# Patient Record
Sex: MALE | Race: WHITE | NOT HISPANIC OR LATINO | Employment: FULL TIME | URBAN - METROPOLITAN AREA
[De-identification: names, ages, dates, MRNs, and addresses within clinical notes are randomized per-mention and may not be internally consistent; named-entity substitution may affect disease eponyms.]

---

## 2018-03-20 ENCOUNTER — HISTORICAL (OUTPATIENT)
Dept: RADIATION THERAPY | Facility: HOSPITAL | Age: 38
End: 2018-03-20

## 2018-04-02 ENCOUNTER — HISTORICAL (OUTPATIENT)
Dept: RADIATION THERAPY | Facility: HOSPITAL | Age: 38
End: 2018-04-02

## 2018-04-09 ENCOUNTER — OFFICE VISIT (OUTPATIENT)
Dept: OTOLARYNGOLOGY | Facility: CLINIC | Age: 38
End: 2018-04-09
Payer: COMMERCIAL

## 2018-04-09 ENCOUNTER — CLINICAL SUPPORT (OUTPATIENT)
Dept: AUDIOLOGY | Facility: CLINIC | Age: 38
End: 2018-04-09
Payer: COMMERCIAL

## 2018-04-09 VITALS — BODY MASS INDEX: 31.7 KG/M2 | WEIGHT: 201.94 LBS | HEIGHT: 67 IN

## 2018-04-09 DIAGNOSIS — D32.9 MENINGIOMA: Primary | ICD-10-CM

## 2018-04-09 DIAGNOSIS — Z01.10 EXAMINATION OF EARS AND HEARING: Primary | ICD-10-CM

## 2018-04-09 DIAGNOSIS — R29.810 FACIAL WEAKNESS: ICD-10-CM

## 2018-04-09 PROCEDURE — 92552 PURE TONE AUDIOMETRY AIR: CPT | Mod: S$GLB,,, | Performed by: PHYSICIAN ASSISTANT

## 2018-04-09 PROCEDURE — 92556 SPEECH AUDIOMETRY COMPLETE: CPT | Mod: S$GLB,,, | Performed by: PHYSICIAN ASSISTANT

## 2018-04-09 PROCEDURE — 99999 PR PBB SHADOW E&M-EST. PATIENT-LVL I: CPT | Mod: PBBFAC,,,

## 2018-04-09 PROCEDURE — 99999 PR PBB SHADOW E&M-NEW PATIENT-LVL III: CPT | Mod: PBBFAC,,, | Performed by: OTOLARYNGOLOGY

## 2018-04-09 PROCEDURE — 99205 OFFICE O/P NEW HI 60 MIN: CPT | Mod: S$GLB,,, | Performed by: OTOLARYNGOLOGY

## 2018-04-09 RX ORDER — SERTRALINE HYDROCHLORIDE 50 MG/1
TABLET, FILM COATED ORAL
COMMUNITY
Start: 2018-02-03

## 2018-04-09 RX ORDER — PANTOPRAZOLE SODIUM 40 MG/1
TABLET, DELAYED RELEASE ORAL
COMMUNITY
Start: 2018-04-03

## 2018-04-09 RX ORDER — FENOFIBRATE 145 MG/1
TABLET, FILM COATED ORAL
COMMUNITY
Start: 2018-04-08

## 2018-04-09 RX ORDER — MECLIZINE HYDROCHLORIDE 25 MG/1
25 TABLET ORAL EVERY 12 HOURS
Refills: 0 | COMMUNITY
Start: 2018-03-28

## 2018-04-09 NOTE — PROGRESS NOTES
Audiological Evaluation:    Test results indicated normal hearing AS and a mild loss AD at 8k Hz. with excellent speech discrimination scores bilaterally.  Recommend:  1.  Otologic evaluation  2.  Annual hearing evaluations  3.  Noise protection when necessary

## 2018-04-09 NOTE — LETTER
April 9, 2018      Mitchel Farias MD  [address]  [facility] [address]           [facility] - [facility]  [address]  [facility] [address]  Phone: [phone]  Fax: [phone]          Patient: Marcelo Farias   MR Number: [id number]   YOB: 1980   Date of Visit: 4/9/2018       Dear Dr. Mitchel Farias:    Thank you for referring Marcelo Farias to me for evaluation. Attached you will find relevant portions of my assessment and plan of care.    If you have questions, please do not hesitate to call me. I look forward to following Marcelo Farias along with you.    Sincerely,    Juan English MD    Enclosure  CC:  No Recipients    If you would like to receive this communication electronically, please contact [email or web address] or [phone] to request more information on [employer] Link access.    For providers and/or their staff who would like to refer a patient to [facility], please contact us through our one-stop-shop provider referral line, [facility], at [phone].    If you feel you have received this communication in error or would no longer like to receive these types of communications, please e-mail [email or web address]

## 2018-04-09 NOTE — PROGRESS NOTES
"Otology/Neurotology Consultation Report       Chief Complaint: Right sided facial weakness     History of Present Illness: 37 y.o. year old male presents a referral from Dr. Wade Turner ([place], [place]) for evaluation of recurrent episodes (3-4 episodes) of acute/abrupt onset facial paralysis with associated facial numbness/paresthesias. Patient relates first episode occurred in 2012, which he was treated with antibiotics and steroids resolution of symptoms over several days. Patient relates intermittent facial spasms in between episodes and increased spasms preceding episodes. Last episode occurred in Feb 2018, while on vacation in the [facility]. Patients that his facial function has not returned to baseline after this most recent episode. Also relates associated AD fullness, vertigo (lasted several days, now resolved) and dizziness/imblance. No previous head or neck surgery, no other growths or tumors.  Patient relates paternal grandmother with multiple "throat tumors". 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    HENT: Positive for congestion, hearing loss and tinnitus. Negative for ear discharge, ear pain, nosebleeds, sinus pain and sore throat.    Eyes: Negative for blurred vision, double vision, photophobia, pain, discharge and redness.   Respiratory: Negative.  Negative for stridor.    Cardiovascular: Negative.    Gastrointestinal: Negative.    Genitourinary: Negative.    Musculoskeletal: Negative.    Skin: Negative.    Neurological: Positive for dizziness, focal weakness and headaches. Negative for tingling, tremors, speech change, seizures and loss of consciousness.   Endo/Heme/Allergies: Negative.    Psychiatric/Behavioral: Negative.           Past Medical History: Patient has a past medical history of Hearing loss; Hyperlipidemia; and Vertigo.    Past Surgical History: " Patient has a past surgical history that includes Finger fracture surgery (Right, 2005).    Social History: Patient reports that he has never smoked. He has never used smokeless tobacco.    Family History: family history is not on file.    Medications:   No current outpatient prescriptions on file prior to visit.     No current facility-administered medications on file prior to visit.          Allergies: Patient is allergic to penicillins; shellfish containing products; and sulfa (sulfonamide antibiotics).    Physical Exam    Constitutional: Well appearing / communicating.  NAD.  Eyes: EOM I Bilaterally, PERRLA. CN VI intact bilaterally   Head/Face: Normocephalic.  Negative paranasal sinus pressure/tenderness.  Salivary glands WNL.  House Brackmann II/VI on the right. I/VI on the left.     Right Ear: External Auditory Canal WNL,TM w/o masses/lesions/perforations.  Auricle WNL.  Left Ear: External Auditory Canal WNL,TM w/o masses/lesions/perforations. Auricle WNL.  Nose:leftward nasal septal deviation. Inferior Turbinates 2+ bilaterally. No septal perforation. No masses/lesions. External nasal skin without masses/lesions.  Oral Cavity: Gingiva/lips WNL.  FOM Soft, no masses palpated. Oral Tongue mobile. Hard Palate WNL.   Oropharynx: BOT WNL. No masses/lesions noted. Tonsillar fossa/pharyngeal wall without lesions. Posterior oropharynx WNL.  Soft palate without masses. Midline uvula.   Neck/Lymphatic: No LAD I-VI bilaterally.  No thyromegaly.  No masses noted on exam. CN II-XII intact with the exception of VII as noted above   Neuro/Psychiatric: AOx3.  Normal mood and affect.   Cardiovascular: Normal carotid pulses bilaterally, no increasing jugular venous distention noted at cervical region bilaterally.    Respiratory: Normal respiratory effort, no stridor, no retractions noted.                                  Marcelo was seen today for consult.    Diagnoses and all orders for this visit:    Meningioma  -      Ambulatory Referral to Neurosurgery    Facial weakness     vs other lesion, ie neuroma of FN, etc  Long discussion with patient regarding clinical and imaging findings (concerning for meningioma of the petrous apex). Discussed possible treatment options with patient including surgery, radiation, and observation. Will review imaging studies with neuroradiology. Will also refer to neurosurgery.     Need to review imaging with neuroradilogy but pt was unable to leave discs today.

## 2018-04-16 ENCOUNTER — OFFICE VISIT (OUTPATIENT)
Dept: NEUROSURGERY | Facility: CLINIC | Age: 38
End: 2018-04-16
Payer: COMMERCIAL

## 2018-04-16 VITALS
HEIGHT: 67 IN | DIASTOLIC BLOOD PRESSURE: 95 MMHG | HEART RATE: 92 BPM | SYSTOLIC BLOOD PRESSURE: 151 MMHG | WEIGHT: 205.5 LBS | TEMPERATURE: 97 F | BODY MASS INDEX: 32.25 KG/M2

## 2018-04-16 DIAGNOSIS — D32.9 MENINGIOMA: Primary | ICD-10-CM

## 2018-04-16 PROCEDURE — 99204 OFFICE O/P NEW MOD 45 MIN: CPT | Mod: S$GLB,,, | Performed by: NEUROLOGICAL SURGERY

## 2018-04-16 PROCEDURE — 99999 PR PBB SHADOW E&M-EST. PATIENT-LVL III: CPT | Mod: PBBFAC,,, | Performed by: NEUROLOGICAL SURGERY

## 2018-04-16 NOTE — PROGRESS NOTES
This office note has been dictated.  April 16, 2018    Juan English M.D.  Department of Otolaryngology  [facility] [address] [place]  [address]    RE:  MARCELO CUELLAR  [facility] No.:  [id number]      Dear Reji:    Marcelo Cuellar was seen in neurosurgical consultation at the office this morning.    As you know, he is a 37-year-old man who suffered sudden onset of right facial   paralysis in 2012, felt to be Bell's palsy.  He was treated with medication and   the facial paralysis essentially resolved.  No imaging studies were done at that   time.  More recently, he has noted some intermittent twitching in the right   side of his face and feels that the face may be a little more asymmetric.  He   notes that the lower lip tends to pull up.  This February, he was vacationing   and had an attack of vertigo, which lasted for a few days.  This was accompanied   by a sense of fullness in his right ear.  He has noted this blocking sensation   in his ear intermittently.  A CT and MRI were done by his physician in [place]   showing a right petrous lesion and he was referred for ENT and neurosurgical   evaluation.  He does not suffer from headaches.  His vision is good.  He has had   no double vision.  He has had no difficulty speaking or swallowing, no weakness   or numbness in extremities, difficulty with gait or balance. Past medical   history is otherwise unremarkable.  He is an .  He is .    Review of systems is otherwise negative.    On physical examination, he is a well-developed, muscular white male who is   alert and cooperative.  Examination of the head shows no tenderness over the   scalp or over the mastoid area.  Eyes show full extraocular movements.  There is   no nystagmus.  Pupils are equal and reactive to light.  Fundi show clear disk   margins.  Hearing seems fairly well preserved to finger rubbing and tuning fork.    Midline tuning fork was actually  referred to the right ear, suggesting   conductive rather than sensorineural hearing loss.  The neck is supple.  On   neurological examination, he is speaking clearly, provides a good history.    Affect is unremarkable.  Finger-to-nose was done well.  Gait is normal.    Sensation on the face is preserved.  There is very slight paresis of right   facial muscles at rest.  Function is normal on voluntary movement.  His voice is   normal.  The tongue protrudes in midline.  He shows good strength in   extremities, normal sensation and symmetrical deep tendon reflexes.    MRI of the brain was done at [facility] in [place] on 03/05/18.    Ventricular size is normal.  There is a small area of enhancement along the   petrous ridge that slightly indents the temporal lobe.  There is no swelling in   the brain.  A CT scan of the temporal bones was then done at [facility] on 03/12/18, and shows that there is some lytic change in the petrous   bone associated with this.    IMPRESSION:  1.  Right petrous lesion, possible meningioma, cholesterol granuloma or other.  2.  History of Bell palsy, right side.    I will ask our neuroradiologist to review these studies.  This lesion appears   benign.  We may plan to follow it up in six months.  It is unclear whether it   relates to his symptoms.    Thank you for the opportunity to see him in neurosurgical consultation.    Sincerely yours,            FELICIA/MIMA  dd: 04/16/2018 11:40:54 (CDT)  td: 04/17/2018 09:07:37 (CDT)  Doc ID   #[id number]  Job ID #[id number]    CC: Marcelo English M.D.

## 2018-04-16 NOTE — LETTER
April 16, 2018      Juan English MD  [address]  [place] [address]           [facility] - Neurosurgery [facility]  [address]  [facility] [address]  Phone: [phone]          Patient: Marcelo Farias   MR Number: [id number]   YOB: 1980   Date of Visit: 4/16/2018       Dear Dr. Juan English:    Thank you for referring Marcelo Farias to me for evaluation. Attached you will find relevant portions of my assessment and plan of care.    If you have questions, please do not hesitate to call me. I look forward to following Marcelo Farias along with you.    Sincerely,    Merlin Velez MD    Enclosure  CC:  No Recipients    If you would like to receive this communication electronically, please contact [email or web address] or [phone] to request more information on [employer] Link access.    For providers and/or their staff who would like to refer a patient to [facility], please contact us through our one-stop-shop provider referral line, [facility], at [phone].    If you feel you have received this communication in error or would no longer like to receive these types of communications, please e-mail [email or web address]

## 2018-05-15 ENCOUNTER — TELEPHONE (OUTPATIENT)
Dept: NEUROSURGERY | Facility: CLINIC | Age: 38
End: 2018-05-15

## 2018-05-15 NOTE — TELEPHONE ENCOUNTER
SW PT, HE RELATED THAT HE OBTAINED A SECOND OPINION, AND WOULD LIKE TO MEET WITH DR BEGUM. APPT MADE WITH DR BEGUM, WILL SEE IF DR BEGUM WANTS A NEW MRI AT 3 MONTHS. APPT SLIP IN THE MAIL.

## 2018-05-15 NOTE — TELEPHONE ENCOUNTER
----- Message from Avis Cooper sent at 5/15/2018 12:35 PM CDT -----  Contact: self @ [phone]  Pt was advised to call if there were any problems.  Pt says he will probably need a f/u appt with Dr Velez.  Pls call.

## 2018-05-16 ENCOUNTER — TELEPHONE (OUTPATIENT)
Dept: OTOLARYNGOLOGY | Facility: CLINIC | Age: 38
End: 2018-05-16

## 2018-05-16 NOTE — TELEPHONE ENCOUNTER
----- Message from Sapna Tidwell sent at 5/16/2018 10:13 AM CDT -----  Contact: Pt  Pt would like to be called back regarding scheduling an appt    Pt can be reached at [phone]

## 2018-05-25 ENCOUNTER — OFFICE VISIT (OUTPATIENT)
Dept: OTOLARYNGOLOGY | Facility: CLINIC | Age: 38
End: 2018-05-25
Payer: COMMERCIAL

## 2018-05-25 VITALS — WEIGHT: 201.5 LBS | BODY MASS INDEX: 31.63 KG/M2 | HEIGHT: 67 IN

## 2018-05-25 DIAGNOSIS — G51.9 FACIAL NERVE DISORDER: Primary | ICD-10-CM

## 2018-05-25 PROCEDURE — 99999 PR PBB SHADOW E&M-EST. PATIENT-LVL III: CPT | Mod: PBBFAC,,, | Performed by: OTOLARYNGOLOGY

## 2018-05-25 PROCEDURE — 99214 OFFICE O/P EST MOD 30 MIN: CPT | Mod: S$GLB,,, | Performed by: OTOLARYNGOLOGY

## 2018-05-25 RX ORDER — SCOLOPAMINE TRANSDERMAL SYSTEM 1 MG/1
1 PATCH, EXTENDED RELEASE TRANSDERMAL
Refills: 0 | COMMUNITY
Start: 2018-05-07

## 2018-05-25 RX ORDER — ONDANSETRON 4 MG/1
TABLET, ORALLY DISINTEGRATING ORAL
Refills: 0 | COMMUNITY
Start: 2018-05-07

## 2018-05-25 NOTE — PROGRESS NOTES
Subjective:       Patient ID: Marcelo Farias is a 38 y.o. male.    Chief Complaint: Other (discuss surgery)    HPI: Here for F/U.    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    Recent consult with Dr Velez.    No new C/O.    Still with Facial twitching but good function.    Past Medical History: Patient has a past medical history of Hearing loss; Hyperlipidemia; and Vertigo.    Past Surgical History: Patient has a past surgical history that includes Finger fracture surgery (Right, 2005).    Social History: Patient reports that he has never smoked. He has never used smokeless tobacco.    Family History: family history is not on file.    Medications:   Current Outpatient Prescriptions   Medication Sig    fenofibrate (TRICOR) 145 MG tablet     meclizine (ANTIVERT) 25 mg tablet Take 25 mg by mouth every 12 (twelve) hours.    ondansetron (ZOFRAN-ODT) 4 MG TbDL DISSOLVE 1 TABLET BY MOUTH EVERY 6 HOURS AS NEEDED FOR NAUSEA    pantoprazole (PROTONIX) 40 MG tablet     scopolamine (TRANSDERM-SCOP) 1.3-1.5 mg (1 mg over 3 days) Place 1 patch onto the skin every 72 hours.    sertraline (ZOLOFT) 50 MG tablet      No current facility-administered medications for this visit.        Allergies: Patient is allergic to penicillins; shellfish containing products; and sulfa (sulfonamide antibiotics).      Review of Systems   Constitutional: Negative.    HENT: Positive for hearing loss. Negative for ear discharge, ear pain and tinnitus.    Neurological: Positive for facial asymmetry. Negative for dizziness, seizures, syncope, speech difficulty, weakness, light-headedness and headaches.       Objective:      Physical Exam   Constitutional: He is oriented to person, place, and time. He appears well-developed and well-nourished. He is cooperative.  Non-toxic appearance. He does not have a sickly appearance. He does not appear ill. No distress.   HENT:   Head: Normocephalic and atraumatic. Not macrocephalic and  not microcephalic. Head is without raccoon's eyes, without Xavier's sign, without abrasion, without contusion, without laceration, without right periorbital erythema and without left periorbital erythema. Hair is normal.   Right Ear: Ear canal normal. No lacerations. No drainage, swelling or tenderness. No foreign bodies. No mastoid tenderness. Tympanic membrane is not injected, not scarred, not perforated, not erythematous, not retracted and not bulging. Tympanic membrane mobility is normal. No middle ear effusion. No hemotympanum. Decreased hearing is noted.   Left Ear: Ear canal normal. No lacerations. No drainage, swelling or tenderness. No foreign bodies. No mastoid tenderness. Tympanic membrane is not injected, not scarred, not perforated, not erythematous, not retracted and not bulging. Tympanic membrane mobility is normal.  No middle ear effusion. No hemotympanum. No decreased hearing is noted.   Nose: No mucosal edema, rhinorrhea, nose lacerations, sinus tenderness, nasal deformity, septal deviation or nasal septal hematoma. No epistaxis.  No foreign bodies. Right sinus exhibits no maxillary sinus tenderness. Left sinus exhibits no maxillary sinus tenderness.       R facial II/VII    Some twitching       Eyes: Conjunctivae, EOM and lids are normal. Pupils are equal, round, and reactive to light.   Neck: Normal range of motion. Neck supple. No JVD present. No tracheal tenderness and no muscular tenderness present. No neck rigidity. No tracheal deviation, no edema, no erythema and normal range of motion present. No thyroid mass and no thyromegaly present.   Cardiovascular: Normal rate and regular rhythm.    Pulmonary/Chest: Effort normal. No accessory muscle usage or stridor. No apnea, no tachypnea and no bradypnea. No respiratory distress.   Abdominal: Soft. Normal appearance.   Musculoskeletal: Normal range of motion.   Lymphadenopathy:        Head (right side): No submental, no submandibular, no tonsillar,  no preauricular and no posterior auricular adenopathy present.        Head (left side): No submental, no submandibular, no tonsillar, no preauricular and no posterior auricular adenopathy present.     He has no cervical adenopathy.        Right cervical: No superficial cervical, no deep cervical and no posterior cervical adenopathy present.       Left cervical: No superficial cervical, no deep cervical and no posterior cervical adenopathy present.   Neurological: He is alert and oriented to person, place, and time. He displays no atrophy and no tremor. No cranial nerve deficit or sensory deficit. He exhibits normal muscle tone. He displays no seizure activity.   Skin: Skin is warm, dry and intact. No abrasion, no bruising, no burn, no ecchymosis, no laceration, no lesion and no rash noted. He is not diaphoretic. No erythema. No pallor.   Psychiatric: He has a normal mood and affect. His behavior is normal. Judgment and thought content normal. His speech is not rapid and/or pressured and not slurred. Cognition and memory are normal. He is communicative.   Nursing note and vitals reviewed.            Assessment:       1. Facial nerve disorder   R geniculate hemangioma       Plan:         Observe for now.    RTC 6 mos with audio/ CT.

## 2018-06-04 ENCOUNTER — OFFICE VISIT (OUTPATIENT)
Dept: NEUROSURGERY | Facility: CLINIC | Age: 38
DRG: 566 | End: 2018-06-04
Payer: COMMERCIAL

## 2018-06-04 ENCOUNTER — HOSPITAL ENCOUNTER (OUTPATIENT)
Facility: HOSPITAL | Age: 38
Discharge: HOME | DRG: 566 | End: 2018-06-08
Attending: NEUROLOGICAL SURGERY | Admitting: NEUROLOGICAL SURGERY
Payer: COMMERCIAL

## 2018-06-04 VITALS
SYSTOLIC BLOOD PRESSURE: 164 MMHG | TEMPERATURE: 98 F | WEIGHT: 200.63 LBS | HEART RATE: 125 BPM | BODY MASS INDEX: 31.42 KG/M2 | DIASTOLIC BLOOD PRESSURE: 106 MMHG

## 2018-06-04 DIAGNOSIS — K21.9 GASTROESOPHAGEAL REFLUX DISEASE WITHOUT ESOPHAGITIS: ICD-10-CM

## 2018-06-04 DIAGNOSIS — F41.9 ANXIETY: ICD-10-CM

## 2018-06-04 DIAGNOSIS — R42 VERTIGO: ICD-10-CM

## 2018-06-04 DIAGNOSIS — D18.00 HEMANGIOMA: ICD-10-CM

## 2018-06-04 DIAGNOSIS — D18.02 BRAIN HEMANGIOMA: Primary | ICD-10-CM

## 2018-06-04 DIAGNOSIS — M89.8X8 MASS OF PETROUS TEMPORAL BONE: Primary | ICD-10-CM

## 2018-06-04 PROCEDURE — 20600001 HC STEP DOWN PRIVATE ROOM

## 2018-06-04 PROCEDURE — 99223 1ST HOSP IP/OBS HIGH 75: CPT | Mod: ,,, | Performed by: OTOLARYNGOLOGY

## 2018-06-04 PROCEDURE — 99499 UNLISTED E&M SERVICE: CPT | Mod: S$GLB,,, | Performed by: NEUROLOGICAL SURGERY

## 2018-06-04 PROCEDURE — G0379 DIRECT REFER HOSPITAL OBSERV: HCPCS

## 2018-06-04 PROCEDURE — 99999 PR PBB SHADOW E&M-EST. PATIENT-LVL III: CPT | Mod: PBBFAC,,, | Performed by: NEUROLOGICAL SURGERY

## 2018-06-04 PROCEDURE — G0378 HOSPITAL OBSERVATION PER HR: HCPCS

## 2018-06-04 PROCEDURE — 99223 1ST HOSP IP/OBS HIGH 75: CPT | Mod: ,,, | Performed by: PHYSICIAN ASSISTANT

## 2018-06-04 RX ORDER — IBUPROFEN 200 MG
24 TABLET ORAL
Status: DISCONTINUED | OUTPATIENT
Start: 2018-06-04 | End: 2018-06-08 | Stop reason: HOSPADM

## 2018-06-04 RX ORDER — DIAZEPAM 5 MG/5ML
2.5 SOLUTION ORAL EVERY 8 HOURS PRN
Status: DISCONTINUED | OUTPATIENT
Start: 2018-06-04 | End: 2018-06-08 | Stop reason: HOSPADM

## 2018-06-04 RX ORDER — DOXYCYCLINE 100 MG/1
100 CAPSULE ORAL 2 TIMES DAILY
Refills: 0 | COMMUNITY
Start: 2018-06-01

## 2018-06-04 RX ORDER — SERTRALINE HYDROCHLORIDE 50 MG/1
50 TABLET, FILM COATED ORAL DAILY
Status: DISCONTINUED | OUTPATIENT
Start: 2018-06-05 | End: 2018-06-08 | Stop reason: HOSPADM

## 2018-06-04 RX ORDER — DIAZEPAM 5 MG/1
TABLET ORAL
Refills: 0 | Status: ON HOLD | COMMUNITY
Start: 2018-06-01 | End: 2018-06-08 | Stop reason: HOSPADM

## 2018-06-04 RX ORDER — IBUPROFEN 200 MG
16 TABLET ORAL
Status: DISCONTINUED | OUTPATIENT
Start: 2018-06-04 | End: 2018-06-08 | Stop reason: HOSPADM

## 2018-06-04 RX ORDER — PANTOPRAZOLE SODIUM 40 MG/1
40 TABLET, DELAYED RELEASE ORAL DAILY
Status: DISCONTINUED | OUTPATIENT
Start: 2018-06-05 | End: 2018-06-08 | Stop reason: HOSPADM

## 2018-06-04 RX ORDER — ONDANSETRON 4 MG/1
4 TABLET, ORALLY DISINTEGRATING ORAL EVERY 6 HOURS PRN
Status: DISCONTINUED | OUTPATIENT
Start: 2018-06-04 | End: 2018-06-08 | Stop reason: HOSPADM

## 2018-06-04 RX ORDER — PREDNISONE 20 MG/1
TABLET ORAL
Refills: 0 | COMMUNITY
Start: 2018-06-01

## 2018-06-04 RX ORDER — MECLIZINE HYDROCHLORIDE 25 MG/1
25 TABLET ORAL 2 TIMES DAILY PRN
Status: DISCONTINUED | OUTPATIENT
Start: 2018-06-04 | End: 2018-06-08 | Stop reason: HOSPADM

## 2018-06-04 RX ORDER — CEFDINIR 300 MG/1
CAPSULE ORAL
Refills: 0 | COMMUNITY
Start: 2018-06-02

## 2018-06-04 RX ORDER — GLUCAGON 1 MG
1 KIT INJECTION
Status: DISCONTINUED | OUTPATIENT
Start: 2018-06-04 | End: 2018-06-08 | Stop reason: HOSPADM

## 2018-06-04 RX ORDER — LORAZEPAM 1 MG/1
1 TABLET ORAL DAILY PRN
Status: DISCONTINUED | OUTPATIENT
Start: 2018-06-04 | End: 2018-06-08 | Stop reason: HOSPADM

## 2018-06-04 NOTE — SUBJECTIVE & OBJECTIVE
No prescriptions prior to admission.       Review of patient's allergies indicates:   Allergen Reactions    Penicillins     Shellfish containing products     Sulfa (sulfonamide antibiotics)        Past Medical History:   Diagnosis Date    Hearing loss     Hyperlipidemia     Vertigo      Past Surgical History:   Procedure Laterality Date    FINGER FRACTURE SURGERY Right 2005     R index finger compound fracture     Family History     None        Social History Main Topics    Smoking status: Never Smoker    Smokeless tobacco: Never Used    Alcohol use Not on file    Drug use: Unknown    Sexual activity: Not on file     Review of Systems   Constitutional: no fever, chills or night sweats. No changes in weight   Eyes: no visual changes   ENT: no nasal congestion or sore throat, Positive for R ear symptoms.    Respiratory: no cough or shortness of breath   Cardiovascular: no chest pain or palpitations   Gastrointestinal: no nausea or vomiting   Genitourinary: no hematuria or dysuria   Integument/Breast: no rash or pruritis   Hematologic/Lymphatic: no easy bruising or lymphadenopathy   Musculoskeletal: no arthralgias or myalgias.  Neurological: no seizures or tremors. No weakness or paresthesia. Positive for vertigo and dizziness.   Behavioral/Psych: no auditory or visual hallucinations   Endocrine: no heat or cold intolerance     Objective:        There is no height or weight on file to calculate BMI.  Vital Signs (Most Recent):    Vital Signs (24h Range):  Temp:  [98 °F (36.7 °C)] 98 °F (36.7 °C)  Pulse:  [125] 125  BP: (164)/(106) 164/106                           Neurosurgery Physical Exam  General: well developed, well nourished. no acute distress.  Neurologic: Awake, alert and oriented x3. Thought content appropriate.  Head: normocephalic, atraumatic   GCS: Motor: 6/Verbal: 5/Eyes: 4 GCS Total: 15  Cranial nerves:face symmetric and sensation intact bilaterally, tongue midline, pupils equal, round,  reactive to light with accomodation, extraocular muscles intact. CN II-XII grossly intact. Shoulder shrug strength equal. Palate rises symmetrically.  Uvula midline. Hearing equal with finger rub. Gag and taste not tested.     Language: no aphasia  Speech: no dysarthria     Sensory: response to light touch throughout  Motor Strength: Moves all extremities spontaneously with good tone. Full strength upper and lower extremities. No abnormal movements seen.          Strength  Deltoids Triceps Biceps Wrist Extension Wrist Flexion Hand    Upper: R 5/5 5/5 5/5 5/5 5/5 5/5    L 5/5 5/5 5/5 5/5 5/5 5/5     Iliopsoas Quadriceps Knee  Flexion Tibialis  anterior Gastro- cnemius EHL   Lower: R 5/5 5/5 5/5 5/5 5/5 5/5    L 5/5 5/5 5/5 5/5 5/5 5/5   Interossi muscle strength- intact    Pronator Drift: no drift noted  Coordination: finger to nose normal bilaterally  DTR: 2+ and symmetric in UE and LE   Hubbard: absent  Gait: Normal  Tandem Gait: No difficulty. Able to walk on heels & toes   Full cervical and lumbar ROM  No focal numbness or weakness  ENT: normal hearing with finger rub  Heart: RRR, no cyanosis, pallor, or edema.   Lungs:  normal respiratory effort  Abdomen: soft, non-tender and symmetric  Extremities: warm with no cyanosis, edema, or clubbing  Pulses: palpable distal pulses  Skin: warm, dry and intact. No visible rashes or lesions.         Significant Labs:  No results for input(s): GLU, NA, K, CL, CO2, BUN, CREATININE, CALCIUM, MG in the last 48 hours.  No results for input(s): WBC, HGB, HCT, PLT in the last 48 hours.  No results for input(s): LABPT, INR, APTT in the last 48 hours.  Microbiology Results (last 7 days)     ** No results found for the last 168 hours. **            Significant Diagnostics:  I have reviewed and interpreted all pertinent imaging results/findings within the past 24 hours.

## 2018-06-04 NOTE — HPI
Marcelo Farias is 38 y.o. male with history of R petrous lesion, bell's palsy, Gerd, and anxiety who has chronic vertigo and dizziness from this lesion and seen by Dr. Velez today in clinic. Admitted to [facility] for worsening symptoms. Patient reports vertigo has significantly worsened since last visit in 4/2018. He now has vertigo every 2 days and associated blurry vision during these episodes. He also has continued R ear fullness/swishing/ringing that is slightly worsened as well. He feels that R facial weakness has not changed and intermittently spontaneous R facial twitching.     Of note, he went to urgent care on Friday and was prescribed omnicef and steroids per presumed ear infection. He is unable to tolerate steroids as it worsens his anxiety.

## 2018-06-04 NOTE — ASSESSMENT & PLAN NOTE
38 y.o. male with history of R petrous lesion, bell's palsy, Gerd, and anxiety who has chronic vertigo and dizziness from this lesion and seen by Dr. Velez today in clinic. Admitted to [facility] [place] symptoms.     -Patient neurostable on exam with intermittent symptoms.  -Plan to admit to neurostep down for monitoring.  -Will obtain MRI brain w/wo contrast with fiesta posterior fossa protocol.   -Resume home meds for GERD, anxiety, and vertigo.   -Zofran prn nausea.   -Consult ENT, Dr. English given worsening symptoms.   -Will obtain labs erwin AM.     Discussed with Dr. Velez

## 2018-06-04 NOTE — LETTER
June 4, 2018      Raul Cotter MD  [address] [place] [address]  [address]  [facility] [address]           [facility] - Neurosurgery 7th Fl  [address]  [place] [address]  Phone: [phone]          Patient: Marcelo Farias   MR Number: [id number]   YOB: 1980   Date of Visit: 6/4/2018       Dear Dr. Raul Cotter:    Thank you for referring Marcelo Farias to me for evaluation. Attached you will find relevant portions of my assessment and plan of care.    If you have questions, please do not hesitate to call me. I look forward to following Marcelo Farias along with you.    Sincerely,    Merlin Velez MD    Enclosure  CC:  No Recipients    If you would like to receive this communication electronically, please contact [email or web address] or [phone] to request more information on [employer] Link access.    For providers and/or their staff who would like to refer a patient to [facility], please contact us through our one-stop-shop provider referral line, [facility] , at [phone].    If you feel you have received this communication in error or would no longer like to receive these types of communications, please e-mail [email or web address]

## 2018-06-04 NOTE — PROGRESS NOTES
This office note has been dictated.  June 4, 2018    Juan English M.D.  Department of Otolaryngology  [facility] [address] [place]  [address]    RE:  JODY CUELLAR  [facility]  [id number]    Dear Reji:    Jody Cuellar returned in neurosurgical followup to the office today.  You saw him   back on 05/25/18, for reevaluation of the right geniculate hemangioma.  At that   point, he continued with very light right facial weakness and mild hearing   loss, but was apparently otherwise stable.  He had been seen in neurosurgical   consultation by Dr. Hurt in [place] on 05/14/18, who referred him back here.    Over the last few days he has had severe vertigo, nausea, vomiting, associated   anxiety.  He has had difficulty walking.  He has not been able to go to work.    He was seen in an Emergency Room and given steroid and antibiotics.  He feels   the steroid may have made him worse.    On examination this afternoon he is alert, but very anxious and in distress.  He   is unable to stand up.  He has mild facial twitching on the right side.    Extraocular movements appear intact.  He feels that his vision is shimmering as   he describes it.    As we discussed this fairly rare condition generally involves the facial nerve   and most surgery that has been done for it has resulted in facial weakness and   requirement for nerve grafting.  I am unclear why he is doing somewhat worse in   the past week.  Since he lives in [place], I will bring him into the hospital,   update his MRI. Perhaps angiography would be helpful in elucidating the blood   supply of this lesion.  We can do some additional workup while he is in the   hospital and see how we wish to proceed.    Thank you again for the opportunity to work with you in his care.    Sincerely yours,          FELICIA/MIMA  dd: 06/04/2018 15:11:20 (CDT)  td: 06/05/2018 13:02:23 (CDT)  Doc ID   #[id number]  Job ID #[id number]    CC: Jody Cuellar    Juan English M.D.

## 2018-06-04 NOTE — H&P
[facility]  Neuorsurgery  History and Physical     Patient Name: Marcelo Farias  MRN: [id number]  Admission Date: (Not on file)  Attending Physician: Merlin Velez MD   Primary Care Physician: Raul Cotter MD    Patient information was obtained from patient, spouse/SO, parent, past medical records and ER records.     Subjective:     Chief Complaint/Reason for Admission: vertigo     HPI:  Marcelo Farias is 38 y.o. male with history of R petrous lesion, bell's palsy, Gerd, and anxiety who has chronic vertigo and dizziness from this lesion and seen by Dr. Velez today in clinic. Admitted to [facility] for worsening symptoms. Patient reports vertigo has significantly worsened since last visit in 4/2018. He now has vertigo every 2 days and associated blurry vision during these episodes. He also has continued R ear fullness/swishing/ringing that is slightly worsened as well. He feels that R facial weakness has not changed and intermittently spontaneous R facial twitching.     Of note, he went to urgent care on Friday and was prescribed omnicef and steroids per presumed ear infection. He is unable to tolerate steroids as it worsens his anxiety.     No prescriptions prior to admission.       Review of patient's allergies indicates:   Allergen Reactions    Penicillins     Shellfish containing products     Sulfa (sulfonamide antibiotics)        Past Medical History:   Diagnosis Date    Hearing loss     Hyperlipidemia     Vertigo      Past Surgical History:   Procedure Laterality Date    FINGER FRACTURE SURGERY Right 2005     R index finger compound fracture     Family History     None        Social History Main Topics    Smoking status: Never Smoker    Smokeless tobacco: Never Used    Alcohol use Not on file    Drug use: Unknown    Sexual activity: Not on file     Review of Systems   Constitutional: no fever, chills or night sweats. No changes in weight   Eyes: no visual changes   ENT: no nasal  congestion or sore throat, Positive for R ear symptoms.    Respiratory: no cough or shortness of breath   Cardiovascular: no chest pain or palpitations   Gastrointestinal: no nausea or vomiting   Genitourinary: no hematuria or dysuria   Integument/Breast: no rash or pruritis   Hematologic/Lymphatic: no easy bruising or lymphadenopathy   Musculoskeletal: no arthralgias or myalgias.  Neurological: no seizures or tremors. No weakness or paresthesia. Positive for vertigo and dizziness.   Behavioral/Psych: no auditory or visual hallucinations   Endocrine: no heat or cold intolerance     Objective:        There is no height or weight on file to calculate BMI.  Vital Signs (Most Recent):    Vital Signs (24h Range):  Temp:  [98 °F (36.7 °C)] 98 °F (36.7 °C)  Pulse:  [125] 125  BP: (164)/(106) 164/106                           Neurosurgery Physical Exam  General: well developed, well nourished. no acute distress.  Neurologic: Awake, alert and oriented x3. Thought content appropriate.  Head: normocephalic, atraumatic   GCS: Motor: 6/Verbal: 5/Eyes: 4 GCS Total: 15  Cranial nerves:face symmetric and sensation intact bilaterally, tongue midline, pupils equal, round, reactive to light with accomodation, extraocular muscles intact. CN II-XII grossly intact. Shoulder shrug strength equal. Palate rises symmetrically.  Uvula midline. Hearing equal with finger rub. Gag and taste not tested.     Language: no aphasia  Speech: no dysarthria     Sensory: response to light touch throughout  Motor Strength: Moves all extremities spontaneously with good tone. Full strength upper and lower extremities. No abnormal movements seen.          Strength  Deltoids Triceps Biceps Wrist Extension Wrist Flexion Hand    Upper: R 5/5 5/5 5/5 5/5 5/5 5/5    L 5/5 5/5 5/5 5/5 5/5 5/5     Iliopsoas Quadriceps Knee  Flexion Tibialis  anterior Gastro- cnemius EHL   Lower: R 5/5 5/5 5/5 5/5 5/5 5/5    L 5/5 5/5 5/5 5/5 5/5 5/5   Interossi muscle strength-  intact    Pronator Drift: no drift noted  Coordination: finger to nose normal bilaterally  DTR: 2+ and symmetric in UE and LE   Hubbard: absent  Gait: Normal  Tandem Gait: No difficulty. Able to walk on heels & toes   Full cervical and lumbar ROM  No focal numbness or weakness  ENT: normal hearing with finger rub  Heart: RRR, no cyanosis, pallor, or edema.   Lungs:  normal respiratory effort  Abdomen: soft, non-tender and symmetric  Extremities: warm with no cyanosis, edema, or clubbing  Pulses: palpable distal pulses  Skin: warm, dry and intact. No visible rashes or lesions.         Significant Labs:  No results for input(s): GLU, NA, K, CL, CO2, BUN, CREATININE, CALCIUM, MG in the last 48 hours.  No results for input(s): WBC, HGB, HCT, PLT in the last 48 hours.  No results for input(s): LABPT, INR, APTT in the last 48 hours.  Microbiology Results (last 7 days)     ** No results found for the last 168 hours. **            Significant Diagnostics:  I have reviewed and interpreted all pertinent imaging results/findings within the past 24 hours.    Assessment and Plan:     Mass of petrous temporal bone    38 y.o. male with history of R petrous lesion, bell's palsy, Gerd, and anxiety who has chronic vertigo and dizziness from this lesion and seen by Dr. Velez today in clinic. Admitted to [facility] for worsening symptoms.     -Patient neurostable on exam with intermittent symptoms.  -Plan to admit to neurostep down for monitoring.  -Will obtain MRI brain w/wo contrast with fiesta posterior fossa protocol.   -Resume home meds for GERD, anxiety, and vertigo.   -Zofran prn nausea.   -Consult ENT, Dr. English given worsening symptoms.   -Will obtain labs erwin AM.     Discussed with Dr. Agustin Mi PA-C  Neurosurgery  [facility]

## 2018-06-05 PROBLEM — K21.9 GASTROESOPHAGEAL REFLUX DISEASE WITHOUT ESOPHAGITIS: Status: ACTIVE | Noted: 2018-06-05

## 2018-06-05 PROBLEM — M89.8X8 MASS OF PETROUS TEMPORAL BONE: Status: ACTIVE | Noted: 2018-06-05

## 2018-06-05 PROBLEM — F41.9 ANXIETY: Status: ACTIVE | Noted: 2018-06-05

## 2018-06-05 PROBLEM — R42 VERTIGO: Status: ACTIVE | Noted: 2018-06-05

## 2018-06-05 LAB
ANION GAP SERPL CALC-SCNC: 10 MMOL/L
BASOPHILS # BLD AUTO: 0.06 K/UL
BASOPHILS NFR BLD: 0.8 %
BUN SERPL-MCNC: 15 MG/DL
CALCIUM SERPL-MCNC: 9.3 MG/DL
CHLORIDE SERPL-SCNC: 106 MMOL/L
CO2 SERPL-SCNC: 25 MMOL/L
CREAT SERPL-MCNC: 1.2 MG/DL
DIFFERENTIAL METHOD: NORMAL
EOSINOPHIL # BLD AUTO: 0.1 K/UL
EOSINOPHIL NFR BLD: 1.6 %
ERYTHROCYTE [DISTWIDTH] IN BLOOD BY AUTOMATED COUNT: 12.5 %
EST. GFR  (AFRICAN AMERICAN): >60 ML/MIN/1.73 M^2
EST. GFR  (NON AFRICAN AMERICAN): >60 ML/MIN/1.73 M^2
GLUCOSE SERPL-MCNC: 97 MG/DL
HCT VFR BLD AUTO: 44.7 %
HGB BLD-MCNC: 15 G/DL
IMM GRANULOCYTES # BLD AUTO: 0.02 K/UL
IMM GRANULOCYTES NFR BLD AUTO: 0.3 %
LYMPHOCYTES # BLD AUTO: 3.1 K/UL
LYMPHOCYTES NFR BLD: 42.4 %
MCH RBC QN AUTO: 28.5 PG
MCHC RBC AUTO-ENTMCNC: 33.6 G/DL
MCV RBC AUTO: 85 FL
MONOCYTES # BLD AUTO: 0.8 K/UL
MONOCYTES NFR BLD: 10.5 %
NEUTROPHILS # BLD AUTO: 3.3 K/UL
NEUTROPHILS NFR BLD: 44.4 %
NRBC BLD-RTO: 0 /100 WBC
PLATELET # BLD AUTO: 254 K/UL
PMV BLD AUTO: 9.8 FL
POTASSIUM SERPL-SCNC: 3.9 MMOL/L
RBC # BLD AUTO: 5.26 M/UL
SODIUM SERPL-SCNC: 141 MMOL/L
WBC # BLD AUTO: 7.4 K/UL

## 2018-06-05 PROCEDURE — 36415 COLL VENOUS BLD VENIPUNCTURE: CPT

## 2018-06-05 PROCEDURE — 20600001 HC STEP DOWN PRIVATE ROOM

## 2018-06-05 PROCEDURE — 99233 SBSQ HOSP IP/OBS HIGH 50: CPT | Mod: ,,, | Performed by: PHYSICIAN ASSISTANT

## 2018-06-05 PROCEDURE — G0378 HOSPITAL OBSERVATION PER HR: HCPCS

## 2018-06-05 PROCEDURE — 80048 BASIC METABOLIC PNL TOTAL CA: CPT

## 2018-06-05 PROCEDURE — A9585 GADOBUTROL INJECTION: HCPCS | Performed by: NEUROLOGICAL SURGERY

## 2018-06-05 PROCEDURE — 85025 COMPLETE CBC W/AUTO DIFF WBC: CPT

## 2018-06-05 PROCEDURE — 25500020 PHARM REV CODE 255: Performed by: NEUROLOGICAL SURGERY

## 2018-06-05 PROCEDURE — 25000003 PHARM REV CODE 250: Performed by: PHYSICIAN ASSISTANT

## 2018-06-05 PROCEDURE — 63600175 PHARM REV CODE 636 W HCPCS: Performed by: PHYSICIAN ASSISTANT

## 2018-06-05 RX ORDER — GADOBUTROL 604.72 MG/ML
10 INJECTION INTRAVENOUS
Status: COMPLETED | OUTPATIENT
Start: 2018-06-05 | End: 2018-06-05

## 2018-06-05 RX ADMIN — MECLIZINE HYDROCHLORIDE 25 MG: 25 TABLET ORAL at 03:06

## 2018-06-05 RX ADMIN — GADOBUTROL 10 ML: 604.72 INJECTION INTRAVENOUS at 05:06

## 2018-06-05 RX ADMIN — SERTRALINE HYDROCHLORIDE 50 MG: 50 TABLET ORAL at 08:06

## 2018-06-05 RX ADMIN — LORAZEPAM 1 MG: 1 TABLET ORAL at 06:06

## 2018-06-05 RX ADMIN — PANTOPRAZOLE SODIUM 40 MG: 40 TABLET, DELAYED RELEASE ORAL at 08:06

## 2018-06-05 RX ADMIN — ONDANSETRON 4 MG: 8 TABLET, ORALLY DISINTEGRATING ORAL at 03:06

## 2018-06-05 RX ADMIN — DIAZEPAM 2.5 MG: 5 SOLUTION ORAL at 02:06

## 2018-06-05 NOTE — PLAN OF CARE
NIKKI following for DC needs. NIKKI in communication with CM.    Praveena Andre, LMSW [facility]  [id number]

## 2018-06-05 NOTE — HOSPITAL COURSE
6/4: admitted from neurosurgery clinic for worsening vertigo. ENT consulted while in house.   6/5: NAEON. Pending MRI brain w/wo contrast with fiesta posterior fossa protocol.   6/6: NAEON. MRI brain done and reviewed with Dr. Velez. Plan for cerebral angiogram today.   6/8: Angio negative for abnormalities, ENT rec outpatient f/u. Stable for dc.

## 2018-06-05 NOTE — PROGRESS NOTES
[facility]  Neurosurgery  Progress Note    Subjective:     History of Present Illness: Marcelo Farias is 38 y.o. male with history of R petrous lesion, bell's palsy, Gerd, and anxiety who has chronic vertigo and dizziness from this lesion and seen by Dr. Velez today in clinic. Admitted to [facility] for worsening symptoms. Patient reports vertigo has significantly worsened since last visit in 4/2018. He now has vertigo every 2 days and associated blurry vision during these episodes. He also has continued R ear fullness/swishing/ringing that is slightly worsened as well. He feels that R facial weakness has not changed and intermittently spontaneous R facial twitching.     Of note, he went to urgent care on Friday and was prescribed omnicef and steroids per presumed ear infection. He is unable to tolerate steroids as it worsens his anxiety.     Post-Op Info:  * No surgery found *         Interval History: NAEON. Pending MRI brain w/wo contrast with fiesta posterior fossa protocol.     Medications:  Continuous Infusions:  Scheduled Meds:   pantoprazole  40 mg Oral Daily    sertraline  50 mg Oral Daily     PRN Meds:dextrose 50%, dextrose 50%, diazePAM, glucagon (human recombinant), glucose, glucose, glucose, LORazepam, meclizine, ondansetron     Review of Systems   Constitutional: no fever, chills or night sweats. No changes in weight   Eyes: no visual changes   ENT: no nasal congestion or sore throat, Positive for R ear symptoms.    Respiratory: no cough or shortness of breath   Cardiovascular: no chest pain or palpitations   Gastrointestinal: no nausea or vomiting   Genitourinary: no hematuria or dysuria   Integument/Breast: no rash or pruritis   Hematologic/Lymphatic: no easy bruising or lymphadenopathy   Musculoskeletal: no arthralgias or myalgias.  Neurological: no seizures or tremors. No weakness or paresthesia. Positive for vertigo and dizziness.   Behavioral/Psych: no auditory or visual  hallucinations   Endocrine: no heat or cold intolerance   Objective:        There is no height or weight on file to calculate BMI.  Vital Signs (Most Recent):  Temp: 98.1 °F (36.7 °C) (06/05/18 0853)  Pulse: 80 (06/05/18 0853)  Resp: 18 (06/05/18 0853)  BP: (!) 140/91 (06/05/18 0853)  SpO2: 97 % (06/05/18 0853) Vital Signs (24h Range):  Temp:  [98 °F (36.7 °C)-99 °F (37.2 °C)] 98.1 °F (36.7 °C)  Pulse:  [] 80  Resp:  [18-20] 18  SpO2:  [97 %-99 %] 97 %  BP: (120-164)/() 140/91                           Neurosurgery Physical Exam  General: well developed, well nourished. no acute distress.  Neurologic: Awake, alert and oriented x3. Thought content appropriate.  Head: normocephalic, atraumatic   GCS: Motor: 6/Verbal: 5/Eyes: 4 GCS Total: 15  Cranial nerves: face symmetric (mild right brow and right lip weakness with smiling) and sensation intact bilaterally, tongue midline, pupils equal, round, reactive to light with accomodation, extraocular muscles intact. CN II-XII grossly intact. Shoulder shrug strength equal. Palate rises symmetrically.  Uvula midline. Hearing equal with finger rub. Gag and taste not tested.      Language: no aphasia  Speech: no dysarthria      Sensory: response to light touch throughout  Motor Strength: Moves all extremities spontaneously with good tone. Full strength upper and lower extremities. No abnormal movements seen.           Strength   Deltoids Triceps Biceps Wrist Extension Wrist Flexion Hand    Upper: R 5/5 5/5 5/5 5/5 5/5 5/5     L 5/5 5/5 5/5 5/5 5/5 5/5       Iliopsoas Quadriceps Knee  Flexion Tibialis  anterior Gastro- cnemius EHL   Lower: R 5/5 5/5 5/5 5/5 5/5 5/5     L 5/5 5/5 5/5 5/5 5/5 5/5   Interossi muscle strength- intact     Pronator Drift: no drift noted  Coordination: finger to nose normal bilaterally  DTR: 2+ and symmetric in UE and LE   Hubbard: absent  Gait: Normal  Tandem Gait: No difficulty. Able to walk on heels & toes   Full cervical and lumbar  ROM  No focal numbness or weakness  ENT: normal hearing with finger rub  Heart: RRR, no cyanosis, pallor, or edema.   Lungs:  normal respiratory effort  Abdomen: soft, non-tender and symmetric  Extremities: warm with no cyanosis, edema, or clubbing  Pulses: palpable distal pulses  Skin: warm, dry and intact. No visible rashes or lesions.         Significant Labs:    Recent Labs  Lab 06/05/18  0604   GLU 97      K 3.9      CO2 25   BUN 15   CREATININE 1.2   CALCIUM 9.3       Recent Labs  Lab 06/05/18  0604   WBC 7.40   HGB 15.0   HCT 44.7        No results for input(s): LABPT, INR, APTT in the last 48 hours.  Microbiology Results (last 7 days)     ** No results found for the last 168 hours. **            Assessment/Plan:     * Mass of petrous temporal bone    38 y.o. male with history of R petrous lesion, bell's palsy, Gerd, and anxiety who has chronic vertigo and dizziness from this lesion and seen by Dr. Velez today in clinic. Admitted to [facility] for worsening symptoms.     -Patient neurostable on exam with intermittent symptoms.  -Pending MRI brain w/wo contrast with fiesta posterior fossa protocol. (scheduled for this afternoon since there are multiple stat MRI prior).   -Resume home meds for GERD, anxiety, and vertigo.   -Zofran prn nausea.   -ENT consulted and following.     Discussed with MARY ELLEN HodgesC  Neurosurgery  [facility]

## 2018-06-05 NOTE — PROGRESS NOTES
Pt was given several meds including sedation medication. Pt was extremely hard to convince to get into the scanner. Pt was placed on the scanner and taken off several times. Pt also laid on the floor in the scan room for several minutes. After scan completed pt became agitated because he felt he was rushed off the scanner. Pt was in MRI for over 3 hours and was delaying availability of the scanner for other critical pts.

## 2018-06-05 NOTE — PROGRESS NOTES
Upon discussing with pt's nurse anxiety and vertigo issue pt to be given Antivert / some discussion with nurse and family requesting Ativan but upon assessment seems pt having vertigo and anxiety has been decreased / pt given PO Antivert and also Zofran for some slight nausea / pt resting in [place] #6 and will re-try MRI in 30 minutes

## 2018-06-05 NOTE — CONSULTS
"[facility]  Otorhinolaryngology-Head & Neck Surgery  Consult Note    Patient Name: Marcelo Farias  MRN: [id number]  Code Status: Full Code  Admission Date: 6/4/2018  Hospital Length of Stay: 0 days  Attending Physician: Merlin Velez MD  Primary Care Provider: Raul Cotter MD    Patient information was obtained from patient.     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  He has been followed closely by Dr. English and Dr. Velez for this matter. Last seen by Dr. English in clinic on 5/25/18, at that time he was relatively stable and we were recommending continued conservative non-surgical management.  He was seen by Dr. Velez today and was admitted for worsening vertiginous symptoms. The patient describes that over last couple weeks (especially last 3 days) he has had these sporadic vertiginous spells.  He has had this in the past in addition to facial spasms/paralysis.  He reports that these episodes usually occur when he is "worked up" or has anxiety.     Medications:  Continuous Infusions:  Scheduled Meds:   [START ON 6/5/2018] pantoprazole  40 mg Oral Daily    [START ON 6/5/2018] sertraline  50 mg Oral Daily     PRN Meds:dextrose 50%, dextrose 50%, diazePAM, glucagon (human recombinant), glucose, glucose, glucose, LORazepam, meclizine, ondansetron     No current facility-administered medications on file prior to encounter.      Current Outpatient Prescriptions on File Prior to Encounter   Medication Sig    cefdinir (OMNICEF) 300 MG capsule TAKE ONE CAPSULE BY MOUTH TWICE A DAY FOR 7 DAYS    diazePAM (VALIUM) 5 MG tablet TAKE 1/2 TABLET BY MOUTH " EVERY 8 HOURS AS NEEDED FOR DIZZINESS    doxycycline (VIBRAMYCIN) 100 MG Cap Take 100 mg by mouth 2 (two) times daily.    fenofibrate (TRICOR) 145 MG tablet     meclizine (ANTIVERT) 25 mg tablet Take 25 mg by mouth every 12 (twelve) hours.    ondansetron (ZOFRAN-ODT) 4 MG TbDL DISSOLVE 1 TABLET BY MOUTH EVERY 6 HOURS AS NEEDED FOR NAUSEA    pantoprazole (PROTONIX) 40 MG tablet     predniSONE (DELTASONE) 20 MG tablet TAKE 1 TABLET BY MOUTH EVERY DAY FOR 5 DAYS. TAKE IN MORNING    scopolamine (TRANSDERM-SCOP) 1.3-1.5 mg (1 mg over 3 days) Place 1 patch onto the skin every 72 hours.    sertraline (ZOLOFT) 50 MG tablet        Review of patient's allergies indicates:   Allergen Reactions    Penicillins     Shellfish containing products     Sulfa (sulfonamide antibiotics)        Past Medical History:   Diagnosis Date    Hearing loss     Hyperlipidemia     Vertigo      Past Surgical History:   Procedure Laterality Date    FINGER FRACTURE SURGERY Right 2005     R index finger compound fracture     Family History     None        Social History Main Topics    Smoking status: Never Smoker    Smokeless tobacco: Never Used    Alcohol use Not on file    Drug use: Unknown    Sexual activity: Not on file     Review of Systems  Objective:     Vital Signs (Most Recent):  Temp: 99 °F (37.2 °C) (06/04/18 1930)  Pulse: 101 (06/04/18 1930)  Resp: 18 (06/04/18 1930)  BP: 139/87 (06/04/18 1930)  SpO2: 99 % (06/04/18 1930) Vital Signs (24h Range):  Temp:  [98 °F (36.7 °C)-99 °F (37.2 °C)] 99 °F (37.2 °C)  Pulse:  [101-125] 101  Resp:  [18] 18  SpO2:  [99 %] 99 %  BP: (139-164)/() 139/87        There is no height or weight on file to calculate BMI.         Physical Exam      Constitutional: He is oriented to person, place, and time. He appears well-developed and well-nourished. He is cooperative.  Non-toxic appearance. He does not have a sickly appearance. He does not appear ill. No distress.     HENT:   Head:  Normocephalic and atraumatic. Not macrocephalic and not microcephalic. Head is without raccoon's eyes, without Xavier's sign, without abrasion, without contusion, without laceration, without right periorbital erythema and without left periorbital erythema. Hair is normal.   Right Ear: Ear canal normal. No lacerations. No drainage, swelling or tenderness. No foreign bodies. No mastoid tenderness. Tympanic membrane is not injected, not scarred, not perforated, not erythematous, not retracted and not bulging. Tympanic membrane mobility is normal. No middle ear effusion. No hemotympanum. Harp lateralize right. Rinne air>bone bilateral.   Left Ear: Ear canal normal. No lacerations. No drainage, swelling or tenderness. No foreign bodies. No mastoid tenderness. Tympanic membrane is not injected, not scarred, not perforated, not erythematous, not retracted and not bulging. Tympanic membrane mobility is normal. No middle ear effusion. No hemotympanum. No decreased hearing is noted.   Nose: No mucosal edema, rhinorrhea, nose lacerations, sinus tenderness, nasal deformity, septal deviation or nasal septal hematoma. No epistaxis.  No foreign bodies. Right sinus exhibits no maxillary sinus tenderness. Left sinus exhibits no maxillary sinus tenderness.   Face:  HB 2/6 right. Some synkinesis. Good eye closure.   Eyes: Conjunctivae, EOM and lids are normal. Pupils are equal, round, and reactive to light. No vertical, horizontal or rotary nystagmus  Neck: Normal range of motion. Neck supple. No JVD present. No tracheal tenderness and no muscular tenderness present. No neck rigidity. No tracheal deviation, no edema, no erythema and normal range of motion present. No thyroid mass and no thyromegaly present.   Cardiovascular: Normal rate and regular rhythm.    Pulmonary/Chest: Effort normal. No accessory muscle usage or stridor. No apnea, no tachypnea and no bradypnea. No respiratory distress.   Abdominal: Soft. Normal appearance.    Musculoskeletal: Normal range of motion.   Lymphadenopathy: He has no cervical adenopathy.   Neurological: He is alert and oriented to person, place, and time. He displays no atrophy and no tremor. HB2/6. He exhibits normal muscle tone. He displays no seizure activity.   Skin: Skin is warm, dry and intact. No abrasion, no bruising, no burn, no ecchymosis, no laceration, no lesion and no rash noted. He is not diaphoretic. No erythema. No pallor.   Psychiatric: He has a normal mood and affect. His behavior is normal. Judgment and thought content normal. His speech is not rapid and/or pressured and not slurred. Cognition and memory are normal. He is communicative.   Nursing note and vitals reviewed.             Significant Labs:  BMP: No results for input(s): GLU, CL, CO2, BUN, CREATININE, CALCIUM, MG in the last 168 hours.    Invalid input(s): SODIUM, POTASSIUM  CBC: No results for input(s): WBC, RBC, HGB, HCT, PLT, MCV, MCH, MCHC in the last 168 hours.    Significant Diagnostics:  None    Assessment/Plan:     Mass of petrous temporal bone    39 yo male with a right skull base/petrous hemangioma.  Admitted for acute worsening symptomatology regarding vertigo.  Currently exam stable compared to recent clinic evaluation.    -Admitted to NSGY for workup, agree  -MRI brain w/wo contrast with fiesta posterior fossa protocol pending   Will review results of scan  -Will discuss with Dr. English          VTE Risk Mitigation         Ordered     IP VTE LOW RISK PATIENT  Once      06/04/18 1922     Place MILLER hose  Until discontinued      06/04/18 1922     Place sequential compression device  Until discontinued      06/04/18 1922          Thank you for your consult. I will follow-up with patient. Please contact us if you have any additional questions.    David Cotter MD  Otorhinolaryngology-Head & Neck Surgery  [facility]

## 2018-06-05 NOTE — HPI
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  He has been followed closely by Dr. English and Dr. Velez for this matter. Last seen by Dr. English in clinic on 5/25/18, at that time he was relatively stable and we were recommending continued conservative non-surgical management.  He was seen by Dr. Velez today and was admitted for worsening vertiginous symptoms. The patient describes that over last couple weeks (especially last 3 days) he has had these sporadic vertiginous spells.  He has had this in the past in addition to facial spasms/paralysis.  He reports that these episodes usually occur when he is "worked up" or has anxiety.   "

## 2018-06-05 NOTE — PLAN OF CARE
CM attempted to obtain discharge planning assessment x 3 today. Each time CM went to the patient's room he was not in the room.

## 2018-06-05 NOTE — SUBJECTIVE & OBJECTIVE
Medications:  Continuous Infusions:  Scheduled Meds:   [START ON 6/5/2018] pantoprazole  40 mg Oral Daily    [START ON 6/5/2018] sertraline  50 mg Oral Daily     PRN Meds:dextrose 50%, dextrose 50%, diazePAM, glucagon (human recombinant), glucose, glucose, glucose, LORazepam, meclizine, ondansetron     No current facility-administered medications on file prior to encounter.      Current Outpatient Prescriptions on File Prior to Encounter   Medication Sig    cefdinir (OMNICEF) 300 MG capsule TAKE ONE CAPSULE BY MOUTH TWICE A DAY FOR 7 DAYS    diazePAM (VALIUM) 5 MG tablet TAKE 1/2 TABLET BY MOUTH EVERY 8 HOURS AS NEEDED FOR DIZZINESS    doxycycline (VIBRAMYCIN) 100 MG Cap Take 100 mg by mouth 2 (two) times daily.    fenofibrate (TRICOR) 145 MG tablet     meclizine (ANTIVERT) 25 mg tablet Take 25 mg by mouth every 12 (twelve) hours.    ondansetron (ZOFRAN-ODT) 4 MG TbDL DISSOLVE 1 TABLET BY MOUTH EVERY 6 HOURS AS NEEDED FOR NAUSEA    pantoprazole (PROTONIX) 40 MG tablet     predniSONE (DELTASONE) 20 MG tablet TAKE 1 TABLET BY MOUTH EVERY DAY FOR 5 DAYS. TAKE IN MORNING    scopolamine (TRANSDERM-SCOP) 1.3-1.5 mg (1 mg over 3 days) Place 1 patch onto the skin every 72 hours.    sertraline (ZOLOFT) 50 MG tablet        Review of patient's allergies indicates:   Allergen Reactions    Penicillins     Shellfish containing products     Sulfa (sulfonamide antibiotics)        Past Medical History:   Diagnosis Date    Hearing loss     Hyperlipidemia     Vertigo      Past Surgical History:   Procedure Laterality Date    FINGER FRACTURE SURGERY Right 2005     R index finger compound fracture     Family History     None        Social History Main Topics    Smoking status: Never Smoker    Smokeless tobacco: Never Used    Alcohol use Not on file    Drug use: Unknown    Sexual activity: Not on file     Review of Systems  Objective:     Vital Signs (Most Recent):  Temp: 99 °F (37.2 °C) (06/04/18 1930)  Pulse:  101 (06/04/18 1930)  Resp: 18 (06/04/18 1930)  BP: 139/87 (06/04/18 1930)  SpO2: 99 % (06/04/18 1930) Vital Signs (24h Range):  Temp:  [98 °F (36.7 °C)-99 °F (37.2 °C)] 99 °F (37.2 °C)  Pulse:  [101-125] 101  Resp:  [18] 18  SpO2:  [99 %] 99 %  BP: (139-164)/() 139/87        There is no height or weight on file to calculate BMI.         Physical Exam      Constitutional: He is oriented to person, place, and time. He appears well-developed and well-nourished. He is cooperative.  Non-toxic appearance. He does not have a sickly appearance. He does not appear ill. No distress.     HENT:   Head: Normocephalic and atraumatic. Not macrocephalic and not microcephalic. Head is without raccoon's eyes, without Xavier's sign, without abrasion, without contusion, without laceration, without right periorbital erythema and without left periorbital erythema. Hair is normal.   Right Ear: Ear canal normal. No lacerations. No drainage, swelling or tenderness. No foreign bodies. No mastoid tenderness. Tympanic membrane is not injected, not scarred, not perforated, not erythematous, not retracted and not bulging. Tympanic membrane mobility is normal. No middle ear effusion. No hemotympanum. Harp lateralize right. Rinne air>bone bilateral.   Left Ear: Ear canal normal. No lacerations. No drainage, swelling or tenderness. No foreign bodies. No mastoid tenderness. Tympanic membrane is not injected, not scarred, not perforated, not erythematous, not retracted and not bulging. Tympanic membrane mobility is normal. No middle ear effusion. No hemotympanum. No decreased hearing is noted.   Nose: No mucosal edema, rhinorrhea, nose lacerations, sinus tenderness, nasal deformity, septal deviation or nasal septal hematoma. No epistaxis.  No foreign bodies. Right sinus exhibits no maxillary sinus tenderness. Left sinus exhibits no maxillary sinus tenderness.   Face:  HB 2/6 right. Some synkinesis. Good eye closure.   Eyes: Conjunctivae, EOM  and lids are normal. Pupils are equal, round, and reactive to light. No vertical, horizontal or rotary nystagmus  Neck: Normal range of motion. Neck supple. No JVD present. No tracheal tenderness and no muscular tenderness present. No neck rigidity. No tracheal deviation, no edema, no erythema and normal range of motion present. No thyroid mass and no thyromegaly present.   Cardiovascular: Normal rate and regular rhythm.    Pulmonary/Chest: Effort normal. No accessory muscle usage or stridor. No apnea, no tachypnea and no bradypnea. No respiratory distress.   Abdominal: Soft. Normal appearance.   Musculoskeletal: Normal range of motion.   Lymphadenopathy: He has no cervical adenopathy.   Neurological: He is alert and oriented to person, place, and time. He displays no atrophy and no tremor. HB2/6. He exhibits normal muscle tone. He displays no seizure activity.   Skin: Skin is warm, dry and intact. No abrasion, no bruising, no burn, no ecchymosis, no laceration, no lesion and no rash noted. He is not diaphoretic. No erythema. No pallor.   Psychiatric: He has a normal mood and affect. His behavior is normal. Judgment and thought content normal. His speech is not rapid and/or pressured and not slurred. Cognition and memory are normal. He is communicative.   Nursing note and vitals reviewed.             Significant Labs:  BMP: No results for input(s): GLU, CL, CO2, BUN, CREATININE, CALCIUM, MG in the last 168 hours.    Invalid input(s): SODIUM, POTASSIUM  CBC: No results for input(s): WBC, RBC, HGB, HCT, PLT, MCV, MCH, MCHC in the last 168 hours.    Significant Diagnostics:  None

## 2018-06-05 NOTE — ASSESSMENT & PLAN NOTE
38 y.o. male with history of R petrous lesion, bell's palsy, Gerd, and anxiety who has chronic vertigo and dizziness from this lesion and seen by Dr. Velez today in clinic. Admitted to [facility] [place] for worsening symptoms.     -Patient neurostable on exam with intermittent symptoms.  -Pending MRI brain w/wo contrast with fiesta posterior fossa protocol. (scheduled for this afternoon since there are multiple stat MRI prior).   -Resume home meds for GERD, anxiety, and vertigo.   -Zofran prn nausea.   -ENT consulted and following.     Discussed with Dr. Velez

## 2018-06-05 NOTE — SUBJECTIVE & OBJECTIVE
Interval History: NAEON. Pending MRI brain w/wo contrast with fiesta posterior fossa protocol.     Medications:  Continuous Infusions:  Scheduled Meds:   pantoprazole  40 mg Oral Daily    sertraline  50 mg Oral Daily     PRN Meds:dextrose 50%, dextrose 50%, diazePAM, glucagon (human recombinant), glucose, glucose, glucose, LORazepam, meclizine, ondansetron     Review of Systems   Constitutional: no fever, chills or night sweats. No changes in weight   Eyes: no visual changes   ENT: no nasal congestion or sore throat, Positive for R ear symptoms.    Respiratory: no cough or shortness of breath   Cardiovascular: no chest pain or palpitations   Gastrointestinal: no nausea or vomiting   Genitourinary: no hematuria or dysuria   Integument/Breast: no rash or pruritis   Hematologic/Lymphatic: no easy bruising or lymphadenopathy   Musculoskeletal: no arthralgias or myalgias.  Neurological: no seizures or tremors. No weakness or paresthesia. Positive for vertigo and dizziness.   Behavioral/Psych: no auditory or visual hallucinations   Endocrine: no heat or cold intolerance   Objective:        There is no height or weight on file to calculate BMI.  Vital Signs (Most Recent):  Temp: 98.1 °F (36.7 °C) (06/05/18 0853)  Pulse: 80 (06/05/18 0853)  Resp: 18 (06/05/18 0853)  BP: (!) 140/91 (06/05/18 0853)  SpO2: 97 % (06/05/18 0853) Vital Signs (24h Range):  Temp:  [98 °F (36.7 °C)-99 °F (37.2 °C)] 98.1 °F (36.7 °C)  Pulse:  [] 80  Resp:  [18-20] 18  SpO2:  [97 %-99 %] 97 %  BP: (120-164)/() 140/91                           Neurosurgery Physical Exam  General: well developed, well nourished. no acute distress.  Neurologic: Awake, alert and oriented x3. Thought content appropriate.  Head: normocephalic, atraumatic   GCS: Motor: 6/Verbal: 5/Eyes: 4 GCS Total: 15  Cranial nerves: face symmetric (mild right brow and right lip weakness with smiling) and sensation intact bilaterally, tongue midline, pupils equal, round,  reactive to light with accomodation, extraocular muscles intact. CN II-XII grossly intact. Shoulder shrug strength equal. Palate rises symmetrically.  Uvula midline. Hearing equal with finger rub. Gag and taste not tested.      Language: no aphasia  Speech: no dysarthria      Sensory: response to light touch throughout  Motor Strength: Moves all extremities spontaneously with good tone. Full strength upper and lower extremities. No abnormal movements seen.           Strength   Deltoids Triceps Biceps Wrist Extension Wrist Flexion Hand    Upper: R 5/5 5/5 5/5 5/5 5/5 5/5     L 5/5 5/5 5/5 5/5 5/5 5/5       Iliopsoas Quadriceps Knee  Flexion Tibialis  anterior Gastro- cnemius EHL   Lower: R 5/5 5/5 5/5 5/5 5/5 5/5     L 5/5 5/5 5/5 5/5 5/5 5/5   Interossi muscle strength- intact     Pronator Drift: no drift noted  Coordination: finger to nose normal bilaterally  DTR: 2+ and symmetric in UE and LE   Hubbard: absent  Gait: Normal  Tandem Gait: No difficulty. Able to walk on heels & toes   Full cervical and lumbar ROM  No focal numbness or weakness  ENT: normal hearing with finger rub  Heart: RRR, no cyanosis, pallor, or edema.   Lungs:  normal respiratory effort  Abdomen: soft, non-tender and symmetric  Extremities: warm with no cyanosis, edema, or clubbing  Pulses: palpable distal pulses  Skin: warm, dry and intact. No visible rashes or lesions.         Significant Labs:    Recent Labs  Lab 06/05/18  0604   GLU 97      K 3.9      CO2 25   BUN 15   CREATININE 1.2   CALCIUM 9.3       Recent Labs  Lab 06/05/18  0604   WBC 7.40   HGB 15.0   HCT 44.7        No results for input(s): LABPT, INR, APTT in the last 48 hours.  Microbiology Results (last 7 days)     ** No results found for the last 168 hours. **

## 2018-06-05 NOTE — ASSESSMENT & PLAN NOTE
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  Admitted for acute worsening symptomatology regarding vertigo.  Currently exam stable compared to recent clinic evaluation.    -Admitted to NSGY for workup, agree  -MRI brain w/wo contrast with fiesta posterior fossa protocol pending   Will review results of scan  -Will discuss with Dr. English

## 2018-06-06 PROBLEM — D18.00 HEMANGIOMA: Status: ACTIVE | Noted: 2018-06-06

## 2018-06-06 LAB
ANION GAP SERPL CALC-SCNC: 11 MMOL/L
BASOPHILS # BLD AUTO: 0.05 K/UL
BASOPHILS NFR BLD: 0.7 %
BUN SERPL-MCNC: 15 MG/DL
CALCIUM SERPL-MCNC: 9.3 MG/DL
CHLORIDE SERPL-SCNC: 105 MMOL/L
CO2 SERPL-SCNC: 23 MMOL/L
CREAT SERPL-MCNC: 1 MG/DL
DIFFERENTIAL METHOD: NORMAL
EOSINOPHIL # BLD AUTO: 0.2 K/UL
EOSINOPHIL NFR BLD: 2.3 %
ERYTHROCYTE [DISTWIDTH] IN BLOOD BY AUTOMATED COUNT: 12.2 %
EST. GFR  (AFRICAN AMERICAN): >60 ML/MIN/1.73 M^2
EST. GFR  (NON AFRICAN AMERICAN): >60 ML/MIN/1.73 M^2
GLUCOSE SERPL-MCNC: 97 MG/DL
HCT VFR BLD AUTO: 44.3 %
HGB BLD-MCNC: 15.2 G/DL
IMM GRANULOCYTES # BLD AUTO: 0.02 K/UL
IMM GRANULOCYTES NFR BLD AUTO: 0.3 %
LYMPHOCYTES # BLD AUTO: 2.6 K/UL
LYMPHOCYTES NFR BLD: 38.6 %
MCH RBC QN AUTO: 28.8 PG
MCHC RBC AUTO-ENTMCNC: 34.3 G/DL
MCV RBC AUTO: 84 FL
MONOCYTES # BLD AUTO: 0.6 K/UL
MONOCYTES NFR BLD: 8.4 %
NEUTROPHILS # BLD AUTO: 3.4 K/UL
NEUTROPHILS NFR BLD: 49.7 %
NRBC BLD-RTO: 0 /100 WBC
PLATELET # BLD AUTO: 269 K/UL
PMV BLD AUTO: 9.8 FL
POTASSIUM SERPL-SCNC: 3.6 MMOL/L
RBC # BLD AUTO: 5.27 M/UL
SODIUM SERPL-SCNC: 139 MMOL/L
WBC # BLD AUTO: 6.82 K/UL

## 2018-06-06 PROCEDURE — 25000003 PHARM REV CODE 250: Performed by: PHYSICIAN ASSISTANT

## 2018-06-06 PROCEDURE — 99233 SBSQ HOSP IP/OBS HIGH 50: CPT | Mod: ,,, | Performed by: PHYSICIAN ASSISTANT

## 2018-06-06 PROCEDURE — 36415 COLL VENOUS BLD VENIPUNCTURE: CPT

## 2018-06-06 PROCEDURE — 20600001 HC STEP DOWN PRIVATE ROOM

## 2018-06-06 PROCEDURE — 80048 BASIC METABOLIC PNL TOTAL CA: CPT

## 2018-06-06 PROCEDURE — 85025 COMPLETE CBC W/AUTO DIFF WBC: CPT

## 2018-06-06 PROCEDURE — G0378 HOSPITAL OBSERVATION PER HR: HCPCS

## 2018-06-06 RX ORDER — SENNOSIDES 8.6 MG/1
8.6 TABLET ORAL DAILY PRN
Status: DISCONTINUED | OUTPATIENT
Start: 2018-06-06 | End: 2018-06-07

## 2018-06-06 RX ADMIN — LORAZEPAM 1 MG: 1 TABLET ORAL at 07:06

## 2018-06-06 RX ADMIN — SENNA 8.6 MG: 8.6 TABLET, COATED ORAL at 12:06

## 2018-06-06 RX ADMIN — PANTOPRAZOLE SODIUM 40 MG: 40 TABLET, DELAYED RELEASE ORAL at 09:06

## 2018-06-06 NOTE — PLAN OF CARE
CM met with patient and spouse this am to obtain discharge planning assessment. Planned discharge is home with family - Plan (A) or home with family and Home Health - Plan (B).    PCP:  Raul Cotter MD     Payor:  Payor: AETNA / Plan: [employer]ET[employer] OPEN ACCESS MANAGED CHOICE / Product Type: Commercial /      Pharmacy:    [facility]/pharmacy #[id number] - [place], [place] - [address]  [address] [address]  Phone: [phone] Fax: [phone]

## 2018-06-06 NOTE — PLAN OF CARE
NIKKI following for DC needs. NIKKI in communication with CM.    Praveena Andre, LMSW [facility]  [id number]

## 2018-06-06 NOTE — PROGRESS NOTES
[facility]  Neurosurgery  Progress Note    Subjective:     History of Present Illness: Marcelo Farias is 38 y.o. male with history of R petrous lesion, bell's palsy, Gerd, and anxiety who has chronic vertigo and dizziness from this lesion and seen by Dr. Velez today in clinic. Admitted to [facility] for worsening symptoms. Patient reports vertigo has significantly worsened since last visit in 4/2018. He now has vertigo every 2 days and associated blurry vision during these episodes. He also has continued R ear fullness/swishing/ringing that is slightly worsened as well. He feels that R facial weakness has not changed and intermittently spontaneous R facial twitching.     Of note, he went to urgent care on Friday and was prescribed omnicef and steroids per presumed ear infection. He is unable to tolerate steroids as it worsens his anxiety.     Post-Op Info:  * No surgery found *         Interval History: AAMIR. MRI brain done and reviewed with Dr. Velez. Plan for cerebral angiogram today.     Medications:  Continuous Infusions:  Scheduled Meds:   pantoprazole  40 mg Oral Daily    sertraline  50 mg Oral Daily     PRN Meds:dextrose 50%, dextrose 50%, diazePAM, glucagon (human recombinant), glucose, glucose, glucose, LORazepam, meclizine, ondansetron     Review of Systems   Constitutional: no fever, chills or night sweats. No changes in weight   Eyes: no visual changes   ENT: no nasal congestion or sore throat, Positive for R ear symptoms.    Respiratory: no cough or shortness of breath   Cardiovascular: no chest pain or palpitations   Gastrointestinal: no nausea or vomiting   Genitourinary: no hematuria or dysuria   Integument/Breast: no rash or pruritis   Hematologic/Lymphatic: no easy bruising or lymphadenopathy   Musculoskeletal: no arthralgias or myalgias.  Neurological: no seizures or tremors. No weakness or paresthesia. Positive for vertigo and dizziness.   Behavioral/Psych: no auditory or visual  hallucinations. Positive for anxiety.    Endocrine: no heat or cold intolerance   Objective:        There is no height or weight on file to calculate BMI.  Vital Signs (Most Recent):  Temp: 96.3 °F (35.7 °C) (06/06/18 0813)  Pulse: 75 (06/06/18 0813)  Resp: 18 (06/06/18 0813)  BP: 116/84 (06/06/18 0813)  SpO2: 95 % (06/06/18 0813) Vital Signs (24h Range):  Temp:  [96.3 °F (35.7 °C)-97.9 °F (36.6 °C)] 96.3 °F (35.7 °C)  Pulse:  [62-76] 75  Resp:  [16-18] 18  SpO2:  [94 %-97 %] 95 %  BP: (109-144)/(67-84) 116/84                           Neurosurgery Physical Exam  General: well developed, well nourished. no acute distress.  Neurologic: Awake, alert and oriented x3. Thought content appropriate.  Head: normocephalic, atraumatic   GCS: Motor: 6/Verbal: 5/Eyes: 4 GCS Total: 15  Cranial nerves: face symmetric (mild right brow and right lip weakness with smiling) and sensation intact bilaterally, tongue midline, pupils equal, round, reactive to light with accomodation, extraocular muscles intact. CN II-XII grossly intact. Shoulder shrug strength equal. Palate rises symmetrically.  Uvula midline. Hearing equal with finger rub. Gag and taste not tested.      Language: no aphasia  Speech: no dysarthria      Sensory: response to light touch throughout  Motor Strength: Moves all extremities spontaneously with good tone. Full strength upper and lower extremities. No abnormal movements seen.           Strength   Deltoids Triceps Biceps Wrist Extension Wrist Flexion Hand    Upper: R 5/5 5/5 5/5 5/5 5/5 5/5     L 5/5 5/5 5/5 5/5 5/5 5/5       Iliopsoas Quadriceps Knee  Flexion Tibialis  anterior Gastro- cnemius EHL   Lower: R 5/5 5/5 5/5 5/5 5/5 5/5     L 5/5 5/5 5/5 5/5 5/5 5/5   Interossi muscle strength- intact     Pronator Drift: no drift noted  Coordination: finger to nose normal bilaterally  DTR: 2+ and symmetric in UE and LE   Hubbard: absent  Gait: Normal  Tandem Gait: No difficulty. Able to walk on heels & toes   Full  cervical and lumbar ROM  No focal numbness or weakness  ENT: normal hearing with finger rub  Heart: RRR, no cyanosis, pallor, or edema.   Lungs:  normal respiratory effort  Abdomen: soft, non-tender and symmetric  Extremities: warm with no cyanosis, edema, or clubbing  Pulses: palpable distal pulses  Skin: warm, dry and intact. No visible rashes or lesions.     Significant Labs:    Recent Labs  Lab 06/05/18  0604 06/06/18  0501   GLU 97 97    139   K 3.9 3.6    105   CO2 25 23   BUN 15 15   CREATININE 1.2 1.0   CALCIUM 9.3 9.3       Recent Labs  Lab 06/05/18  0604 06/06/18  0501   WBC 7.40 6.82   HGB 15.0 15.2   HCT 44.7 44.3    269     No results for input(s): LABPT, INR, APTT in the last 48 hours.  Microbiology Results (last 7 days)     ** No results found for the last 168 hours. **            Significant Diagnostics: I have reviewed all pertinent imaging results/findings within the past 24 hours with Dr. Velez    MRI: Mri Brain W Wo Contrast    Result Date: 6/6/2018  Mri Iac: Continued globular enhancing lesion within the right petrous ICA contiguous with the geniculate which may represent a geniculate hemangioma.  There is superimposed enhancement of the right facial nerve in its distal intra canalicular portion and labyrinthine segment with facial schwannoma to be included in the differential.. MRI brain: Few scattered punctate foci of T2 FLAIR signal hyperintensities in the supratentorial white matter which are nonspecific and of doubtful clinical significance.. No evidence for acute infarction or new parenchymal signal abnormality compared to recent prior.   Electronically signed by: Sanjay Villa DO Date:06/06/2018 Time:09:29      Assessment/Plan:     * Mass of petrous temporal bone    38 y.o. male with history of R petrous lesion, bell's palsy, Gerd, and anxiety who has chronic vertigo and dizziness from this lesion and seen by Dr. Velez today in clinic. 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     -Patient neurostable on exam with intermittent symptoms.  -MRI brain w/wo contrast with fiesta posterior fossa protocol reviewed with Dr. Velez. Will plan for cerebral angiogram for further evaluation of lesion. NPO now. Patient last ate at 8am. Discussed with IR, will be add on case.   -Resume home meds for GERD, anxiety, and vertigo.   -Zofran prn nausea.   -ENT consulted and following.     Discussed with Dr. Agustin Mi PA-C  Neurosurgery  [facility]

## 2018-06-06 NOTE — SUBJECTIVE & OBJECTIVE
Interval History: NAEON. MRI brain done and reviewed with Dr. Velez. Plan for cerebral angiogram today.     Medications:  Continuous Infusions:  Scheduled Meds:   pantoprazole  40 mg Oral Daily    sertraline  50 mg Oral Daily     PRN Meds:dextrose 50%, dextrose 50%, diazePAM, glucagon (human recombinant), glucose, glucose, glucose, LORazepam, meclizine, ondansetron     Review of Systems   Constitutional: no fever, chills or night sweats. No changes in weight   Eyes: no visual changes   ENT: no nasal congestion or sore throat, Positive for R ear symptoms.    Respiratory: no cough or shortness of breath   Cardiovascular: no chest pain or palpitations   Gastrointestinal: no nausea or vomiting   Genitourinary: no hematuria or dysuria   Integument/Breast: no rash or pruritis   Hematologic/Lymphatic: no easy bruising or lymphadenopathy   Musculoskeletal: no arthralgias or myalgias.  Neurological: no seizures or tremors. No weakness or paresthesia. Positive for vertigo and dizziness.   Behavioral/Psych: no auditory or visual hallucinations. Positive for anxiety.    Endocrine: no heat or cold intolerance   Objective:        There is no height or weight on file to calculate BMI.  Vital Signs (Most Recent):  Temp: 96.3 °F (35.7 °C) (06/06/18 0813)  Pulse: 75 (06/06/18 0813)  Resp: 18 (06/06/18 0813)  BP: 116/84 (06/06/18 0813)  SpO2: 95 % (06/06/18 0813) Vital Signs (24h Range):  Temp:  [96.3 °F (35.7 °C)-97.9 °F (36.6 °C)] 96.3 °F (35.7 °C)  Pulse:  [62-76] 75  Resp:  [16-18] 18  SpO2:  [94 %-97 %] 95 %  BP: (109-144)/(67-84) 116/84                           Neurosurgery Physical Exam  General: well developed, well nourished. no acute distress.  Neurologic: Awake, alert and oriented x3. Thought content appropriate.  Head: normocephalic, atraumatic   GCS: Motor: 6/Verbal: 5/Eyes: 4 GCS Total: 15  Cranial nerves: face symmetric (mild right brow and right lip weakness with smiling) and sensation intact bilaterally, tongue  midline, pupils equal, round, reactive to light with accomodation, extraocular muscles intact. CN II-XII grossly intact. Shoulder shrug strength equal. Palate rises symmetrically.  Uvula midline. Hearing equal with finger rub. Gag and taste not tested.      Language: no aphasia  Speech: no dysarthria      Sensory: response to light touch throughout  Motor Strength: Moves all extremities spontaneously with good tone. Full strength upper and lower extremities. No abnormal movements seen.           Strength   Deltoids Triceps Biceps Wrist Extension Wrist Flexion Hand    Upper: R 5/5 5/5 5/5 5/5 5/5 5/5     L 5/5 5/5 5/5 5/5 5/5 5/5       Iliopsoas Quadriceps Knee  Flexion Tibialis  anterior Gastro- cnemius EHL   Lower: R 5/5 5/5 5/5 5/5 5/5 5/5     L 5/5 5/5 5/5 5/5 5/5 5/5   Interossi muscle strength- intact     Pronator Drift: no drift noted  Coordination: finger to nose normal bilaterally  DTR: 2+ and symmetric in UE and LE   Hubbard: absent  Gait: Normal  Tandem Gait: No difficulty. Able to walk on heels & toes   Full cervical and lumbar ROM  No focal numbness or weakness  ENT: normal hearing with finger rub  Heart: RRR, no cyanosis, pallor, or edema.   Lungs:  normal respiratory effort  Abdomen: soft, non-tender and symmetric  Extremities: warm with no cyanosis, edema, or clubbing  Pulses: palpable distal pulses  Skin: warm, dry and intact. No visible rashes or lesions.     Significant Labs:    Recent Labs  Lab 06/05/18  0604 06/06/18  0501   GLU 97 97    139   K 3.9 3.6    105   CO2 25 23   BUN 15 15   CREATININE 1.2 1.0   CALCIUM 9.3 9.3       Recent Labs  Lab 06/05/18  0604 06/06/18  0501   WBC 7.40 6.82   HGB 15.0 15.2   HCT 44.7 44.3    269     No results for input(s): LABPT, INR, APTT in the last 48 hours.  Microbiology Results (last 7 days)     ** No results found for the last 168 hours. **            Significant Diagnostics: I have reviewed all pertinent imaging results/findings  within the past 24 hours with Dr. Velez    MRI: Mri Brain W Wo Contrast    Result Date: 6/6/2018  Mri Iac: Continued globular enhancing lesion within the right petrous ICA contiguous with the geniculate which may represent a geniculate hemangioma.  There is superimposed enhancement of the right facial nerve in its distal intra canalicular portion and labyrinthine segment with facial schwannoma to be included in the differential.. MRI brain: Few scattered punctate foci of T2 FLAIR signal hyperintensities in the supratentorial white matter which are nonspecific and of doubtful clinical significance.. No evidence for acute infarction or new parenchymal signal abnormality compared to recent prior.   Electronically signed by: Sanjay Villa DO Date:06/06/2018 Time:09:29

## 2018-06-06 NOTE — PLAN OF CARE
06/06/18 1123   Discharge Assessment   Assessment Type Discharge Planning Assessment   Confirmed/corrected address and phone number on facesheet? Yes   Assessment information obtained from? Patient   Communicated expected length of stay with patient/caregiver no   Prior to hospitilization cognitive status: Alert/Oriented   Prior to hospitalization functional status: Independent   Current cognitive status: Alert/Oriented   Current Functional Status: Independent   Lives With spouse   Able to Return to Prior Arrangements yes   Is patient able to care for self after discharge? Yes   Who are your caregiver(s) and their phone number(s)? Lupis Farias - spouse [phone]   Patient's perception of discharge disposition home or selfcare   Readmission Within The Last 30 Days no previous admission in last 30 days   Patient currently being followed by outpatient case management? No   Patient currently receives any other outside agency services? No   Equipment Currently Used at Home none   Do you have any problems affording any of your prescribed medications? No   Is the patient taking medications as prescribed? yes   Does the patient have transportation home? Yes   Transportation Available family or friend will provide   Does the patient receive services at the [facility]? No   Discharge Plan A Home with family   Discharge Plan B Home Health   Patient/Family In Agreement With Plan yes

## 2018-06-06 NOTE — CARE UPDATE
Patient was pleasant throughout shift. VSS, Neuro status stable, and remained free from falls, pain, or harm. AAMIR. ROB.

## 2018-06-07 LAB
ANION GAP SERPL CALC-SCNC: 11 MMOL/L
BASOPHILS # BLD AUTO: 0.06 K/UL
BASOPHILS NFR BLD: 0.8 %
BUN SERPL-MCNC: 19 MG/DL
CALCIUM SERPL-MCNC: 9.2 MG/DL
CHLORIDE SERPL-SCNC: 103 MMOL/L
CO2 SERPL-SCNC: 23 MMOL/L
CREAT SERPL-MCNC: 1.2 MG/DL
DIFFERENTIAL METHOD: NORMAL
EOSINOPHIL # BLD AUTO: 0.3 K/UL
EOSINOPHIL NFR BLD: 3.9 %
ERYTHROCYTE [DISTWIDTH] IN BLOOD BY AUTOMATED COUNT: 12.3 %
EST. GFR  (AFRICAN AMERICAN): >60 ML/MIN/1.73 M^2
EST. GFR  (NON AFRICAN AMERICAN): >60 ML/MIN/1.73 M^2
GLUCOSE SERPL-MCNC: 105 MG/DL
HCT VFR BLD AUTO: 44.2 %
HGB BLD-MCNC: 15 G/DL
IMM GRANULOCYTES # BLD AUTO: 0.02 K/UL
IMM GRANULOCYTES NFR BLD AUTO: 0.3 %
LYMPHOCYTES # BLD AUTO: 2.8 K/UL
LYMPHOCYTES NFR BLD: 38.4 %
MCH RBC QN AUTO: 28.7 PG
MCHC RBC AUTO-ENTMCNC: 33.9 G/DL
MCV RBC AUTO: 85 FL
MONOCYTES # BLD AUTO: 0.7 K/UL
MONOCYTES NFR BLD: 9.2 %
NEUTROPHILS # BLD AUTO: 3.5 K/UL
NEUTROPHILS NFR BLD: 47.4 %
NRBC BLD-RTO: 0 /100 WBC
PLATELET # BLD AUTO: 261 K/UL
PMV BLD AUTO: 9.9 FL
POTASSIUM SERPL-SCNC: 4 MMOL/L
RBC # BLD AUTO: 5.23 M/UL
SODIUM SERPL-SCNC: 137 MMOL/L
WBC # BLD AUTO: 7.39 K/UL

## 2018-06-07 PROCEDURE — 25000003 PHARM REV CODE 250: Performed by: PHYSICIAN ASSISTANT

## 2018-06-07 PROCEDURE — G0378 HOSPITAL OBSERVATION PER HR: HCPCS

## 2018-06-07 PROCEDURE — 85025 COMPLETE CBC W/AUTO DIFF WBC: CPT

## 2018-06-07 PROCEDURE — 80048 BASIC METABOLIC PNL TOTAL CA: CPT

## 2018-06-07 PROCEDURE — 99232 SBSQ HOSP IP/OBS MODERATE 35: CPT | Mod: ,,, | Performed by: NEUROLOGICAL SURGERY

## 2018-06-07 PROCEDURE — 63600175 PHARM REV CODE 636 W HCPCS: Performed by: STUDENT IN AN ORGANIZED HEALTH CARE EDUCATION/TRAINING PROGRAM

## 2018-06-07 PROCEDURE — 20600001 HC STEP DOWN PRIVATE ROOM

## 2018-06-07 PROCEDURE — 63600175 PHARM REV CODE 636 W HCPCS: Performed by: NEUROLOGICAL SURGERY

## 2018-06-07 PROCEDURE — 25500020 PHARM REV CODE 255: Performed by: NEUROLOGICAL SURGERY

## 2018-06-07 PROCEDURE — 36415 COLL VENOUS BLD VENIPUNCTURE: CPT

## 2018-06-07 RX ORDER — ONDANSETRON 2 MG/ML
INJECTION INTRAMUSCULAR; INTRAVENOUS CODE/TRAUMA/SEDATION MEDICATION
Status: DISCONTINUED | OUTPATIENT
Start: 2018-06-07 | End: 2018-06-08 | Stop reason: HOSPADM

## 2018-06-07 RX ORDER — MIDAZOLAM HYDROCHLORIDE 1 MG/ML
INJECTION INTRAMUSCULAR; INTRAVENOUS CODE/TRAUMA/SEDATION MEDICATION
Status: COMPLETED | OUTPATIENT
Start: 2018-06-07 | End: 2018-06-07

## 2018-06-07 RX ORDER — ACETAMINOPHEN 325 MG/1
650 TABLET ORAL EVERY 6 HOURS PRN
Status: DISCONTINUED | OUTPATIENT
Start: 2018-06-07 | End: 2018-06-08 | Stop reason: HOSPADM

## 2018-06-07 RX ORDER — POLYETHYLENE GLYCOL 3350 17 G/17G
17 POWDER, FOR SOLUTION ORAL DAILY
Status: DISCONTINUED | OUTPATIENT
Start: 2018-06-07 | End: 2018-06-08 | Stop reason: HOSPADM

## 2018-06-07 RX ORDER — FENTANYL CITRATE 50 UG/ML
INJECTION, SOLUTION INTRAMUSCULAR; INTRAVENOUS CODE/TRAUMA/SEDATION MEDICATION
Status: COMPLETED | OUTPATIENT
Start: 2018-06-07 | End: 2018-06-07

## 2018-06-07 RX ORDER — MIDAZOLAM HYDROCHLORIDE 1 MG/ML
2 INJECTION, SOLUTION INTRAMUSCULAR; INTRAVENOUS ONCE
Status: COMPLETED | OUTPATIENT
Start: 2018-06-07 | End: 2018-06-07

## 2018-06-07 RX ORDER — IODIXANOL 320 MG/ML
200 INJECTION, SOLUTION INTRAVASCULAR
Status: COMPLETED | OUTPATIENT
Start: 2018-06-07 | End: 2018-06-07

## 2018-06-07 RX ORDER — AMOXICILLIN 250 MG
2 CAPSULE ORAL 2 TIMES DAILY
Status: DISCONTINUED | OUTPATIENT
Start: 2018-06-07 | End: 2018-06-08 | Stop reason: HOSPADM

## 2018-06-07 RX ADMIN — FENTANYL CITRATE 50 MCG: 50 INJECTION, SOLUTION INTRAMUSCULAR; INTRAVENOUS at 09:06

## 2018-06-07 RX ADMIN — MIDAZOLAM HYDROCHLORIDE 1 MG: 1 INJECTION, SOLUTION INTRAMUSCULAR; INTRAVENOUS at 09:06

## 2018-06-07 RX ADMIN — PANTOPRAZOLE SODIUM 40 MG: 40 TABLET, DELAYED RELEASE ORAL at 03:06

## 2018-06-07 RX ADMIN — FENTANYL CITRATE 25 MCG: 50 INJECTION, SOLUTION INTRAMUSCULAR; INTRAVENOUS at 10:06

## 2018-06-07 RX ADMIN — IODIXANOL 160 ML: 320 INJECTION, SOLUTION INTRAVASCULAR at 10:06

## 2018-06-07 RX ADMIN — ONDANSETRON 4 MG: 2 INJECTION INTRAMUSCULAR; INTRAVENOUS at 10:06

## 2018-06-07 RX ADMIN — SERTRALINE HYDROCHLORIDE 50 MG: 50 TABLET ORAL at 03:06

## 2018-06-07 RX ADMIN — LORAZEPAM 1 MG: 1 TABLET ORAL at 10:06

## 2018-06-07 RX ADMIN — MIDAZOLAM HYDROCHLORIDE 2 MG: 1 INJECTION, SOLUTION INTRAMUSCULAR; INTRAVENOUS at 09:06

## 2018-06-07 RX ADMIN — POLYETHYLENE GLYCOL 3350 17 G: 17 POWDER, FOR SOLUTION ORAL at 03:06

## 2018-06-07 NOTE — PROGRESS NOTES
Patient assessed. Denies any headaches, dizziness, N/V, vision changes, LE pain or weakness, or any new symptoms. Only complaint is mild right sided groin soreness.     Groin site soft, no evidence of a hematoma. No active bleeding noted. BLE pedal pulses present and equal. BLE warm and pink. No cyanosis.       Please call with any questions      Dinah Boone PA-C   Neurosurgery   Pager: [phone]

## 2018-06-07 NOTE — PLAN OF CARE
Plan of care reviewed with pt at bedside. Safety precautions initiated. Bed locked in lowest position, call bell within reach, Bed alarm on. [id number].Pt IR angiogram rescheduled for 6/7/18 because of pt breakfast intake on original scheduled date on 6/6/18. IR confirmed they would not be able to take the pt after RN called at 1945 on 6/6/18. NS notified and approved pt diet to return to normal until midnight in preparation for exam. Pt has been NPO since midnight successfully.  Family at bedside. Will continue to monitor.

## 2018-06-07 NOTE — PROGRESS NOTES
"Returned to room per stretcher. Ambulated to chair. States that groin is "a little sore" but otherwise no complaints.   "

## 2018-06-07 NOTE — H&P
IR H and P    History of Present Illness: Marcelo Farias is 38 y.o. male with history of R petrous lesion, bell's palsy, Gerd, and anxiety who has chronic vertigo and dizziness from this lesion and seen by Dr. Velez today in clinic. Admitted to [facility] [place] symptoms. Patient reports vertigo has significantly worsened since last visit in 4/2018. He now has vertigo every 2 days and associated blurry vision during these episodes. He also has continued R ear fullness/swishing/ringing that is slightly worsened as well. He feels that R facial weakness has not changed and intermittently spontaneous R facial twitching.      Medications:  Continuous Infusions:  Scheduled Meds:   pantoprazole  40 mg Oral Daily    sertraline  50 mg Oral Daily      PRN Meds:dextrose 50%, dextrose 50%, diazePAM, glucagon (human recombinant), glucose, glucose, glucose, LORazepam, meclizine, ondansetron      Review of Systems   Constitutional: no fever, chills or night sweats. No changes in weight   Eyes: no visual changes   ENT: no nasal congestion or sore throat, Positive for R ear symptoms.    Respiratory: no cough or shortness of breath   Cardiovascular: no chest pain or palpitations   Gastrointestinal: no nausea or vomiting   Genitourinary: no hematuria or dysuria   Integument/Breast: no rash or pruritis   Hematologic/Lymphatic: no easy bruising or lymphadenopathy   Musculoskeletal: no arthralgias or myalgias.  Neurological: no seizures or tremors. No weakness or paresthesia. Positive for vertigo and dizziness.   Behavioral/Psych: no auditory or visual hallucinations. Positive for anxiety.    Endocrine: no heat or cold intolerance     Objective:      Neurosurgery Physical Exam  General: well developed, well nourished. no acute distress.  Neurologic: Awake, alert and oriented x3. Thought content appropriate.  Head: normocephalic, atraumatic   GCS: Motor: 6/Verbal: 5/Eyes: 4 GCS Total: 15  Cranial nerves: face symmetric (mild right brow and  right lip weakness with smiling) and sensation intact bilaterally, tongue midline, pupils equal, round, reactive to light with accomodation, extraocular muscles intact. CN II-XII grossly intact. Shoulder shrug strength equal. Palate rises symmetrically.  Uvula midline. Hearing equal with finger rub. Gag and taste not tested.      Language: no aphasia  Speech: no dysarthria      Sensory: response to light touch throughout  Motor Strength: Moves all extremities spontaneously with good tone. Full strength upper and lower extremities. No abnormal movements seen.           Strength   Deltoids Triceps Biceps Wrist Extension Wrist Flexion Hand    Upper: R 5/5 5/5 5/5 5/5 5/5 5/5     L 5/5 5/5 5/5 5/5 5/5 5/5       Iliopsoas Quadriceps Knee  Flexion Tibialis  anterior Gastro- cnemius EHL   Lower: R 5/5 5/5 5/5 5/5 5/5 5/5     L 5/5 5/5 5/5 5/5 5/5 5/5   Interossi muscle strength- intact     Pronator Drift: no drift noted  Coordination: finger to nose normal bilaterally  DTR: 2+ and symmetric in UE and LE   Hubbard: absent  Gait: Normal  Tandem Gait: No difficulty. Able to walk on heels & toes   Full cervical and lumbar ROM  No focal numbness or weakness  ENT: normal hearing with finger rub  Heart: RRR, no cyanosis, pallor, or edema.   Lungs:  normal respiratory effort  Abdomen: soft, non-tender and symmetric  Extremities: warm with no cyanosis, edema, or clubbing  Pulses: palpable distal pulses  Skin: warm, dry and intact. No visible rashes or lesions.      ASA2, Mallampati 2    Significant Labs:      Reviewed       Significant Diagnostics: I have reviewed all pertinent imaging results/findings within the past 24 hours with Dr. Velez     MRI: Mri Brain W Wo Contrast     Result Date: 6/6/2018  Mri Iac: Continued globular enhancing lesion within the right petrous ICA contiguous with the geniculate which may represent a geniculate hemangioma.  There is superimposed enhancement of the right facial nerve in its distal intra  canalicular portion and labyrinthine segment with facial schwannoma to be included in the differential.. MRI brain: Few scattered punctate foci of T2 FLAIR signal hyperintensities in the supratentorial white matter which are nonspecific and of doubtful clinical significance.. No evidence for acute infarction or new parenchymal signal abnormality compared to recent prior.   Electronically signed by: Sanjay Villa DO Date:06/06/2018 Time:09:29        Assessment/Plan:          * Mass of petrous temporal bone     38 y.o. male with history of R petrous lesion, bell's palsy, Gerd, and anxiety who has chronic vertigo and dizziness from this lesion and seen by Dr. Velez today in clinic. 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      -- Cerebral angiogram today  -- Sedation plan: moderate.

## 2018-06-07 NOTE — PLAN OF CARE
NIKKI following for DC needs. NIKKI in communication with CM.    Praveena Andre, LMSW [facility]  [id number]

## 2018-06-08 VITALS
OXYGEN SATURATION: 97 % | BODY MASS INDEX: 31.15 KG/M2 | TEMPERATURE: 98 F | RESPIRATION RATE: 18 BRPM | DIASTOLIC BLOOD PRESSURE: 74 MMHG | WEIGHT: 198.44 LBS | HEART RATE: 68 BPM | SYSTOLIC BLOOD PRESSURE: 127 MMHG | HEIGHT: 67 IN

## 2018-06-08 LAB
ANION GAP SERPL CALC-SCNC: 8 MMOL/L
BASOPHILS # BLD AUTO: 0.05 K/UL
BASOPHILS NFR BLD: 0.7 %
BUN SERPL-MCNC: 14 MG/DL
CALCIUM SERPL-MCNC: 9.2 MG/DL
CHLORIDE SERPL-SCNC: 105 MMOL/L
CO2 SERPL-SCNC: 25 MMOL/L
CREAT SERPL-MCNC: 1.1 MG/DL
DIFFERENTIAL METHOD: NORMAL
EOSINOPHIL # BLD AUTO: 0.2 K/UL
EOSINOPHIL NFR BLD: 3 %
ERYTHROCYTE [DISTWIDTH] IN BLOOD BY AUTOMATED COUNT: 12.3 %
EST. GFR  (AFRICAN AMERICAN): >60 ML/MIN/1.73 M^2
EST. GFR  (NON AFRICAN AMERICAN): >60 ML/MIN/1.73 M^2
GLUCOSE SERPL-MCNC: 110 MG/DL
HCT VFR BLD AUTO: 42.6 %
HGB BLD-MCNC: 14.2 G/DL
IMM GRANULOCYTES # BLD AUTO: 0.02 K/UL
IMM GRANULOCYTES NFR BLD AUTO: 0.3 %
LYMPHOCYTES # BLD AUTO: 2.2 K/UL
LYMPHOCYTES NFR BLD: 29 %
MCH RBC QN AUTO: 28.5 PG
MCHC RBC AUTO-ENTMCNC: 33.3 G/DL
MCV RBC AUTO: 85 FL
MONOCYTES # BLD AUTO: 0.7 K/UL
MONOCYTES NFR BLD: 8.5 %
NEUTROPHILS # BLD AUTO: 4.5 K/UL
NEUTROPHILS NFR BLD: 58.5 %
NRBC BLD-RTO: 0 /100 WBC
PLATELET # BLD AUTO: 253 K/UL
PMV BLD AUTO: 9.6 FL
POTASSIUM SERPL-SCNC: 4.1 MMOL/L
RBC # BLD AUTO: 4.99 M/UL
SODIUM SERPL-SCNC: 138 MMOL/L
WBC # BLD AUTO: 7.65 K/UL

## 2018-06-08 PROCEDURE — 99232 SBSQ HOSP IP/OBS MODERATE 35: CPT | Mod: ,,, | Performed by: PHYSICIAN ASSISTANT

## 2018-06-08 PROCEDURE — 25000003 PHARM REV CODE 250: Performed by: PHYSICIAN ASSISTANT

## 2018-06-08 PROCEDURE — 36415 COLL VENOUS BLD VENIPUNCTURE: CPT

## 2018-06-08 PROCEDURE — 85025 COMPLETE CBC W/AUTO DIFF WBC: CPT

## 2018-06-08 PROCEDURE — 80048 BASIC METABOLIC PNL TOTAL CA: CPT

## 2018-06-08 PROCEDURE — G0378 HOSPITAL OBSERVATION PER HR: HCPCS

## 2018-06-08 RX ORDER — LORAZEPAM 1 MG/1
1 TABLET ORAL 2 TIMES DAILY PRN
Qty: 30 TABLET | Refills: 0 | Status: SHIPPED | OUTPATIENT
Start: 2018-06-08 | End: 2018-07-08

## 2018-06-08 RX ADMIN — SERTRALINE HYDROCHLORIDE 50 MG: 50 TABLET ORAL at 09:06

## 2018-06-08 NOTE — PLAN OF CARE
Patient to be discharged home. The patient does not have any home needs. Family to provide transportation home. Neurosurgery clinic to schedule follow up appointment.    Future Appointments  Date Time Provider Department Center   6/22/2018 10:20 AM Pretty Harp RN [facility] NEUROS7 [facility]        06/08/18 1153   Final Note   Assessment Type Final Discharge Note   Discharge Disposition Home   Hospital Follow Up  Appt(s) scheduled? (Neurosurgery clinic to schedule follow up appointment.)   Discharge plans and expectations educations in teach back method with documentation complete? Yes

## 2018-06-08 NOTE — PLAN OF CARE
NIKKI following for DC needs. NIKKI in communication with CM.    Praveena Andre, LMSW [facility]  [id number]

## 2018-06-08 NOTE — ASSESSMENT & PLAN NOTE
38 y.o. male with history of R petrous lesion, bell's palsy, Gerd, and anxiety who has chronic vertigo and dizziness from this lesion and seen by Dr. Velez today in clinic. Admitted to [facility] for worsening symptoms likely due to R geniculate hemangioma.     -Patient neurostable on exam   -Cerebral angiogram 6/7 with no evidence of vascular abnormalities. No embolization.   -Continue home meds for GERD, anxiety, and vertigo.   -Continue Ativan 1 mg daily prn for anxiety.  -Zofran prn nausea.   -ENT consulted, recommending observation at this time. Will make sure appropriate follow up is scheduled.  -Pt stable for dc home. Will schedule 2 wk groin site check.  -Will discuss with Dr. Velez when pt is to follow up in NSGY clinic

## 2018-06-08 NOTE — NURSING
Patient received discharge orders. Discharge instructions reviewed with patient. Patient demonstrated understanding of discharge orders. IV removed. Paper prescriptions given to patient. Waiting for transport to be discharged home with family. Will continue to monitor.

## 2018-06-08 NOTE — ASSESSMENT & PLAN NOTE
38 y.o. male with history of R petrous lesion, bell's palsy, Gerd, and anxiety who has chronic vertigo and dizziness from this lesion and seen by Dr. Velez today in clinic. Admitted to [facility] for worsening symptoms likely due to R geniculate hemangioma.     -Patient neurostable on exam   -Cerebral angiogram 6/7 with no evidence of vascular abnormalities. No embolization.   -Continue home meds for GERD, anxiety, and vertigo.   -Continue Ativan 1 mg daily prn for anxiety.  -Zofran prn nausea.   -ENT consulted, recommending observation at this time. Will make sure appropriate follow up is scheduled.  -Pt stable for dc home. Will schedule 2 wk groin site check.  -Will discuss with Dr. Velez when pt is to follow up in NSGY clinic

## 2018-06-08 NOTE — SUBJECTIVE & OBJECTIVE
Interval History: Pt denies headache, vision changes, numbness/tingling, n/v, or increased vertigo. Ready to be discharged home. ENT recommending outpatient follow up.     Medications:  Continuous Infusions:  Scheduled Meds:   pantoprazole  40 mg Oral Daily    polyethylene glycol  17 g Oral Daily    senna-docusate 8.6-50 mg  2 tablet Oral BID    sertraline  50 mg Oral Daily     PRN Meds:acetaminophen, dextrose 50%, dextrose 50%, diazePAM, glucagon (human recombinant), glucose, glucose, glucose, LORazepam, meclizine, ondansetron, ondansetron     Review of Systems  Objective:     Weight: 90 kg (198 lb 6.6 oz)  Body mass index is 31.08 kg/m².  Vital Signs (Most Recent):  Temp: 98.4 °F (36.9 °C) (06/08/18 0800)  Pulse: 68 (06/08/18 1101)  Resp: 18 (06/08/18 0800)  BP: 127/74 (06/08/18 0800)  SpO2: 97 % (06/08/18 0800) Vital Signs (24h Range):  Temp:  [98.2 °F (36.8 °C)-98.6 °F (37 °C)] 98.4 °F (36.9 °C)  Pulse:  [58-79] 68  Resp:  [16-18] 18  SpO2:  [97 %-99 %] 97 %  BP: (102-127)/(56-74) 127/74                           Neurosurgery Physical Exam  General: well developed, well nourished, no distress.   Head: normocephalic, atraumatic  Neurologic: Alert and oriented. Thought content appropriate.  GCS: Motor: 6/Verbal: 5/Eyes: 4 GCS Total: 15  Mental Status: Awake, Alert, Oriented x 4  Language: No aphasia  Speech: No dysarthria  Cranial nerves: face symmetric, tongue midline, CN II-XII grossly intact.   Eyes: pupils equal, round, reactive to light with accomodation, EOMI.   Pulmonary: normal respirations, no signs of respiratory distress  Abdomen: soft, non-distended, not tender to palpation  Sensory: intact to light touch throughout    Motor Strength: Moves all extremities spontaneously with good tone.  Full strength upper and lower extremities. No abnormal movements seen.     Strength  Deltoids Triceps Biceps Wrist Extension Wrist Flexion Hand    Upper: R 5/5 5/5 5/5 5/5 5/5 5/5    L 5/5 5/5 5/5 5/5 5/5 5/5      Iliopsoas Quadriceps Knee  Flexion Tibialis  anterior Gastro- cnemius EHL   Lower: R 5/5 5/5 5/5 5/5 5/5 5/5    L 5/5 5/5 5/5 5/5 5/5 5/5     Pronator Drift: no drift noted  Finger-to-nose: Intact bilaterally  Hubbard: absent  Clonus: absent  Pulses: 2+ and symmetric radial and dorsalis pedis.  Skin: Skin is warm, dry and intact.  Groin site soft with no erythema or bleeding. No evidence of hematoma.       Significant Labs:    Recent Labs  Lab 06/07/18  0546 06/08/18  0509    110    138   K 4.0 4.1    105   CO2 23 25   BUN 19 14   CREATININE 1.2 1.1   CALCIUM 9.2 9.2       Recent Labs  Lab 06/07/18  0546 06/08/18  0509   WBC 7.39 7.65   HGB 15.0 14.2   HCT 44.2 42.6    253     No results for input(s): LABPT, INR, APTT in the last 48 hours.  Microbiology Results (last 7 days)     ** No results found for the last 168 hours. **        Recent Lab Results       06/08/18  0509      Immature Granulocytes 0.3     Immature Grans (Abs) 0.02  Comment:  Mild elevation in immature granulocytes is non specific and   can be seen in a variety of conditions including stress response,   acute inflammation, trauma and pregnancy. Correlation with other   laboratory and clinical findings is essential.       Anion Gap 8     Baso # 0.05     Basophil% 0.7     BUN, Bld 14     Calcium 9.2     Chloride 105     CO2 25     Creatinine 1.1     Differential Method Automated     eGFR if African American >60.0     eGFR if non  >60.0  Comment:  Calculation used to obtain the estimated glomerular filtration  rate (eGFR) is the CKD-EPI equation.        Eos # 0.2     Eosinophil% 3.0     Glucose 110     Gran # (ANC) 4.5     Gran% 58.5     Hematocrit 42.6     Hemoglobin 14.2     Lymph # 2.2     Lymph% 29.0     MCH 28.5     MCHC 33.3     MCV 85     Mono # 0.7     Mono% 8.5     MPV 9.6     nRBC 0     Platelets 253     Potassium 4.1     RBC 4.99     RDW 12.3     Sodium 138     WBC 7.65         All pertinent labs  from the last 24 hours have been reviewed.    Significant Diagnostics:  No new imaging

## 2018-06-08 NOTE — PROGRESS NOTES
Otolaryngology - Head and Neck Surgery  Progress Note    Subjective: Vertigo and nausea much better. Back to baseline. Facial expression is at baseline. Underwent angiogram yesterday, no embolization took place.     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    Plan:    Had several discussions with patient regarding options, including operative vs observation. The hemangioma seems to be stable in size. Intermittent vertigo is expected with a lesion adjacent to the labyrinth as this one is, and the patient is clinically improved from that standpoint. Facial nerve exam is stable, House-brackmann score remains at 2 of 6.    If pt were to undergo resection of this relatively stable lesion, he would likely be undergoing a facial nerve resection, with nerve grafting to replace the facial nerve/geniculate ganglion. He would be a House-brackmann 6/6 for several months. He would recover some nerve function, if the grafting went well, over the year following surgery. However, outcome of nerve grafting would be worse than his current House-brackmann score of 2 of 6. Average recovery after nerve grafting is 3-4 of 6, and takes a substantial amount of time.   Recommend observation at this time, patient seems to be in agreement, but we will coordinate with the neurosurgery team and ensure we have a cohesive plan.

## 2018-06-08 NOTE — DISCHARGE SUMMARY
[facility]  Neurosurgery  Discharge Summary      Patient Name: Marcelo Farias  MRN: [id number]  Admission Date: 6/4/2018  Hospital Length of Stay: 4 days  Discharge Date and Time:  06/08/2018 2:35 PM  Attending Physician: No att. providers found   Discharging Provider: Alisa Sotelo PA-C  Primary Care Provider: Raul Cotter MD    HPI:   Marcelo Farias is 38 y.o. male with history of R petrous lesion, bell's palsy, Gerd, and anxiety who has chronic vertigo and dizziness from this lesion and seen by Dr. Velez today in clinic. Admitted to [facility] for worsening symptoms. Patient reports vertigo has significantly worsened since last visit in 4/2018. He now has vertigo every 2 days and associated blurry vision during these episodes. He also has continued R ear fullness/swishing/ringing that is slightly worsened as well. He feels that R facial weakness has not changed and intermittently spontaneous R facial twitching.     Of note, he went to urgent care on Friday and was prescribed omnicef and steroids per presumed ear infection. He is unable to tolerate steroids as it worsens his anxiety.     * No surgery found *     Hospital Course: 6/4: admitted from neurosurgery clinic for worsening vertigo. ENT consulted while in house.   6/5: NAEON. Pending MRI brain w/wo contrast with fiesta posterior fossa protocol.   6/6: NAEON. MRI brain done and reviewed with Dr. Velez. Plan for cerebral angiogram today.   6/8: Angio negative for abnormalities, ENT rec outpatient f/u. Stable for dc.     Consults:   Consults         Status Ordering Provider     Inpatient consult to ENT  Once     Provider:  Juan English MD    Completed NO MG          Significant Diagnostic Studies: Labs:   BMP:   Recent Labs  Lab 06/07/18  0546 06/08/18  0509    110    138   K 4.0 4.1    105   CO2 23 25   BUN 19 14   CREATININE 1.2 1.1   CALCIUM 9.2 9.2    and CBC   Recent Labs  Lab 06/07/18  0546  06/08/18  0509   WBC 7.39 7.65   HGB 15.0 14.2   HCT 44.2 42.6    253     Radiology: MRI: Mri Iac: Continued globular enhancing lesion within the right petrous ICA contiguous with the geniculate which may represent a geniculate hemangioma.  There is superimposed enhancement of the right facial nerve in its distal intra canalicular portion and labyrinthine segment with facial schwannoma to be included in the differential..    MRI brain: Few scattered punctate foci of T2 FLAIR signal hyperintensities in the supratentorial white matter which are nonspecific and of doubtful clinical significance..    No evidence for acute infarction or new parenchymal signal abnormality compared to recent prior..    Pending Diagnostic Studies:     None        Final Active Diagnoses:    Diagnosis Date Noted POA    PRINCIPAL PROBLEM:  Mass of petrous temporal bone [M89.9] 06/05/2018 Yes    Hemangioma [D18.00] 06/06/2018 Yes    Anxiety [F41.9] 06/05/2018 Yes    Gastroesophageal reflux disease without esophagitis [K21.9] 06/05/2018 Yes    Vertigo [R42] 06/05/2018 Yes      Problems Resolved During this Admission:    Diagnosis Date Noted Date Resolved POA      Discharged Condition: good    Disposition: Home or Self Care    Follow Up: 2 wk groin site check    Patient Instructions:     Diet Adult Regular     Activity as tolerated     Notify your health care provider if you experience any of the following:  increased confusion or weakness     Notify your health care provider if you experience any of the following:  persistent dizziness, light-headedness, or visual disturbances     Notify your health care provider if you experience any of the following:  worsening rash     Notify your health care provider if you experience any of the following:  severe persistent headache     Notify your health care provider if you experience any of the following:  difficulty breathing or increased cough     Notify your health care provider if you  experience any of the following:  redness, tenderness, or signs of infection (pain, swelling, redness, odor or green/yellow discharge around incision site)     Notify your health care provider if you experience any of the following:  severe uncontrolled pain     Notify your health care provider if you experience any of the following:  persistent nausea and vomiting or diarrhea     Notify your health care provider if you experience any of the following:  temperature >100.4       Medications:  Reconciled Home Medications:      Medication List      START taking these medications    LORazepam 1 MG tablet  Commonly known as:  ATIVAN  Take 1 tablet (1 mg total) by mouth 2 (two) times daily as needed for Anxiety.        CONTINUE taking these medications    cefdinir 300 MG capsule  Commonly known as:  OMNICEF  TAKE ONE CAPSULE BY MOUTH TWICE A DAY FOR 7 DAYS     doxycycline 100 MG Cap  Commonly known as:  VIBRAMYCIN  Take 100 mg by mouth 2 (two) times daily.     fenofibrate 145 MG tablet  Commonly known as:  TRICOR     meclizine 25 mg tablet  Commonly known as:  ANTIVERT  Take 25 mg by mouth every 12 (twelve) hours.     ondansetron 4 MG Tbdl  Commonly known as:  ZOFRAN-ODT  DISSOLVE 1 TABLET BY MOUTH EVERY 6 HOURS AS NEEDED FOR NAUSEA     pantoprazole 40 MG tablet  Commonly known as:  PROTONIX     predniSONE 20 MG tablet  Commonly known as:  DELTASONE  TAKE 1 TABLET BY MOUTH EVERY DAY FOR 5 DAYS. TAKE IN MORNING     scopolamine 1.3-1.5 mg (1 mg over 3 days)  Commonly known as:  TRANSDERM-SCOP  Place 1 patch onto the skin every 72 hours.     sertraline 50 MG tablet  Commonly known as:  ZOLOFT        STOP taking these medications    diazePAM 5 MG tablet  Commonly known as:  VALIUM            Alisa Sotelo PA-C  Neurosurgery  [facility]

## 2018-06-14 DIAGNOSIS — M89.8X8 MASS OF PETROUS TEMPORAL BONE: Primary | ICD-10-CM

## 2018-06-20 ENCOUNTER — TELEPHONE (OUTPATIENT)
Dept: NEUROSURGERY | Facility: CLINIC | Age: 38
End: 2018-06-20

## 2018-06-20 NOTE — TELEPHONE ENCOUNTER
----- Message from Becky Fofana sent at 6/20/2018 10:21 AM CDT -----  Contact: Amanda (Wife) [phone]  Needs Advice    Reason for call: Amanda called to speak to EJ regarding changes in the patient's symptoms     Communication Preference: PHONE     Additional Information:

## 2018-06-20 NOTE — TELEPHONE ENCOUNTER
SW PT, STILL HAVING VERTIGO SYMPTOMS, WENT TO ED IN [place] YESTERDAY AND PRESCRIBED FENERGAN & 5mg VALIUM. SOES NOT SEEM TO HELP. ON HIS SOFA UNABLE TO AMBULATE SINCE YESTERDAY. WILL DISCUSS WITH DR BEGUM AND GET BACK TO HIM BEFORE 5 TODAY. SR BEGUM IS IN SURGERY TODAY FOR MOST OF THE DAY. I DO NOT EXPECT TO SEE HIM UNTIL THIS AFTERNOON.

## 2018-06-20 NOTE — TELEPHONE ENCOUNTER
NIKKI BEGUM, REVIEWED RECENT SYMPTOMS & MEDICATIONS. HE ADVISED PT TO CONTACT DR CONNORS AS HE IS THE PTS ENT MD. THE SYMPTOMS AND DETERMINATION TO PROCEED WITH SURGERY WOULD ORIGINATE WITH THE ENT TEAM PRIMARILY. HE WILL CALL DR AREVALO AND DISCUSS WITH THEM.

## 2019-04-11 NOTE — ASSESSMENT & PLAN NOTE
38 y.o. male with history of R petrous lesion, bell's palsy, Gerd, and anxiety who has chronic vertigo and dizziness from this lesion and seen by Dr. Velez today in clinic. [place] [facility] [place] likely due to R geniculate hemangioma.     -Patient neurostable on exam with intermittent symptoms.  -MRI brain w/wo contrast with fiesta posterior fossa protocol reviewed with Dr. Velez. Will plan for cerebral angiogram for further evaluation of lesion. NPO now. Patient last ate at 8am. Discussed with IR, will be add on case.   -Resume home meds for GERD, anxiety, and vertigo.   -Zofran prn nausea.   -ENT consulted and following.     Discussed with Dr. Velez    none